# Patient Record
Sex: FEMALE | Race: WHITE | NOT HISPANIC OR LATINO | Employment: FULL TIME | URBAN - NONMETROPOLITAN AREA
[De-identification: names, ages, dates, MRNs, and addresses within clinical notes are randomized per-mention and may not be internally consistent; named-entity substitution may affect disease eponyms.]

---

## 2017-04-26 ENCOUNTER — OFFICE VISIT (OUTPATIENT)
Dept: CARDIOLOGY | Facility: CLINIC | Age: 32
End: 2017-04-26

## 2017-04-26 VITALS
HEIGHT: 69 IN | OXYGEN SATURATION: 97 % | SYSTOLIC BLOOD PRESSURE: 124 MMHG | WEIGHT: 293 LBS | DIASTOLIC BLOOD PRESSURE: 86 MMHG | HEART RATE: 71 BPM | BODY MASS INDEX: 43.4 KG/M2

## 2017-04-26 DIAGNOSIS — R07.2 PRECORDIAL PAIN: ICD-10-CM

## 2017-04-26 DIAGNOSIS — I10 ESSENTIAL HYPERTENSION: Primary | ICD-10-CM

## 2017-04-26 PROCEDURE — 93000 ELECTROCARDIOGRAM COMPLETE: CPT | Performed by: PHYSICIAN ASSISTANT

## 2017-04-26 PROCEDURE — 99204 OFFICE O/P NEW MOD 45 MIN: CPT | Performed by: PHYSICIAN ASSISTANT

## 2017-04-26 RX ORDER — LISINOPRIL 20 MG/1
10 TABLET ORAL DAILY
COMMUNITY
End: 2017-10-02

## 2017-04-26 RX ORDER — METFORMIN HYDROCHLORIDE 500 MG/1
1000 TABLET, EXTENDED RELEASE ORAL 2 TIMES DAILY
COMMUNITY
End: 2022-10-18 | Stop reason: SDUPTHER

## 2017-04-26 RX ORDER — LEVOTHYROXINE SODIUM 0.07 MG/1
75 TABLET ORAL DAILY
COMMUNITY
End: 2022-10-18 | Stop reason: SDUPTHER

## 2017-04-26 RX ORDER — NORGESTIMATE AND ETHINYL ESTRADIOL 7DAYSX3 28
1 KIT ORAL DAILY
COMMUNITY
End: 2019-11-12

## 2017-04-26 RX ORDER — DICLOFENAC SODIUM AND MISOPROSTOL 50; 200 MG/1; UG/1
1 TABLET, DELAYED RELEASE ORAL
COMMUNITY
End: 2019-11-12

## 2017-04-26 RX ORDER — METOPROLOL TARTRATE 50 MG/1
50 TABLET, FILM COATED ORAL 2 TIMES DAILY
COMMUNITY
End: 2018-04-03 | Stop reason: SDUPTHER

## 2017-04-26 RX ORDER — ONDANSETRON 4 MG/1
4 TABLET, FILM COATED ORAL AS NEEDED
COMMUNITY
End: 2018-04-03

## 2017-04-26 NOTE — PROGRESS NOTES
Subjective   Janeth Sheets is a 31 y.o. female     Chief Complaint   Patient presents with   • Establish Care     patient appears in office today to est cardiac care; HTN       HPI  The patient presents today for initial evaluation.  She presents because of hypertension and chest pain.  The patient reports that she is had a long-standing history an issue with hypertension.  Her most severe episode of hypertension occurred last summer.  She apparently stopped all medications/antihypertensive therapies at that time.  Blood pressures were as high as 240 over mid 140s by her report.  She started taking clonidine, which she got from her father, and blood pressures eventually did improve.  She went to see her primary care provider and eventually was placed on lisinopril and metoprolol.  There is been some concern because a lisinopril therapy.  She has been cautioned in terms of pregnancy risk with that therapy.  She would like to potentially think about alternate antihypertensive therapy.  She does feel that blood pressures are mostly controlled, but she does have hypertensive excursions at times.  Despite taking her medications faithfully, blood pressures to go between 140 to 150s systolically and mid 80s to mid 90s with regards to diastolic numbers.  At times, she does have chest pain.  She reports precordial chest tightness.  She cannot necessarily correlate this with hypertensive excursions.  She can have this with exertion or with rest.  Occasionally she has referral to bilateral upper extremities.  She has associated dyspnea but no diaphoresis or nausea at that time.  The patient is concerned with symptoms given her family history.  She would like to have further evaluation from cardiac standpoint at this time.      Current Outpatient Prescriptions   Medication Sig Dispense Refill   • diclofenac-misoprostol (ARTHROTEC 50) 50-0.2 MG EC tablet Take 1 tablet by mouth 2 (Two) Times a Day.     • levothyroxine  (SYNTHROID, LEVOTHROID) 75 MCG tablet Take 75 mcg by mouth Daily.     • lisinopril (PRINIVIL,ZESTRIL) 20 MG tablet Take 20 mg by mouth Daily.     • metFORMIN ER (GLUCOPHAGE-XR) 500 MG 24 hr tablet Take 500 mg by mouth 3 (Three) Times a Day. Take one in the AM with breakfast; and 2 in the PM as directed     • metoprolol tartrate (LOPRESSOR) 50 MG tablet Take 50 mg by mouth 2 (Two) Times a Day.     • Multiple Vitamins-Minerals (MULTIVITAMIN ADULT PO) Take  by mouth Daily.     • norgestimate-ethinyl estradiol (TRI-SPRINTEC) 0.18/0.215/0.25 MG-35 MCG per tablet Take 1 tablet by mouth Daily.     • ondansetron (ZOFRAN) 4 MG tablet Take 4 mg by mouth As Needed for Nausea or Vomiting.       No current facility-administered medications for this visit.        Penicillins    Past Medical History:   Diagnosis Date   • Hypertension    • Hypothyroidism    • Polycystic disease, ovaries     PCOS       Social History     Social History   • Marital status:      Spouse name: N/A   • Number of children: N/A   • Years of education: N/A     Occupational History   • Not on file.     Social History Main Topics   • Smoking status: Never Smoker   • Smokeless tobacco: Never Used   • Alcohol use Yes      Comment: occasional    • Drug use: No   • Sexual activity: Defer     Other Topics Concern   • Not on file     Social History Narrative   • No narrative on file       Family History   Problem Relation Age of Onset   • No Known Problems Mother    • Hypertension Father    • Hyperlipidemia Father    • Heart disease Father    • Heart attack Father    • Anemia Father        Review of Systems   Constitutional: Negative for fatigue.   HENT: Negative for congestion, ear pain, hearing loss, sneezing and tinnitus.    Eyes: Positive for visual disturbance (wears glasses /contacts daily).   Respiratory: Negative.  Negative for cough, chest tightness, shortness of breath and wheezing.    Cardiovascular: Positive for chest pain (occasional ) and  "palpitations (occasional ). Negative for leg swelling.   Gastrointestinal: Negative.  Negative for abdominal pain, constipation, diarrhea, nausea and vomiting.   Endocrine: Negative.  Negative for cold intolerance, heat intolerance and polyuria.   Genitourinary: Negative.  Negative for difficulty urinating, frequency and urgency.   Musculoskeletal: Positive for arthralgias. Negative for back pain, myalgias, neck pain and neck stiffness.   Skin: Negative.    Allergic/Immunologic: Positive for environmental allergies (seasonal allergies). Negative for food allergies.   Neurological: Positive for headaches. Negative for dizziness, syncope, weakness, light-headedness and numbness.   Hematological: Negative.  Does not bruise/bleed easily.   Psychiatric/Behavioral: Negative.  Negative for agitation, confusion and sleep disturbance. The patient is not nervous/anxious.        Objective     /86 (BP Location: Left arm, Patient Position: Sitting)  Pulse 71  Ht 69\" (175.3 cm)  Wt 299 lb 12.8 oz (136 kg)  SpO2 97%  BMI 44.27 kg/m2    Lab Results (most recent)     None          Physical Exam   Constitutional: She is oriented to person, place, and time. She appears well-developed and well-nourished. No distress.   HENT:   Head: Normocephalic and atraumatic.   Eyes: Conjunctivae and EOM are normal. Pupils are equal, round, and reactive to light.   Neck: Normal range of motion. Neck supple. No JVD present. No tracheal deviation present.   Cardiovascular: Normal rate, regular rhythm, normal heart sounds and intact distal pulses.    Pulmonary/Chest: Effort normal and breath sounds normal.   Abdominal: Soft. Bowel sounds are normal. She exhibits no distension and no mass. There is no tenderness. There is no rebound and no guarding.   Musculoskeletal: Normal range of motion. She exhibits no edema, tenderness or deformity.   Neurological: She is alert and oriented to person, place, and time.   Skin: Skin is warm and dry. No " rash noted. No erythema. No pallor.   Psychiatric: She has a normal mood and affect. Her behavior is normal. Judgment and thought content normal.   Nursing note and vitals reviewed.      Procedure     ECG 12 Lead  Date/Time: 4/26/2017 10:19 AM  Performed by: SARAHY SMITH  Authorized by: SARAHY SMITH   Comments: HTN  Sinus arrhythmia, normal axis, RSR prime in V1 and V2, no acute changes noted.                 Assessment/Plan      Diagnosis Plan   1. Essential hypertension  ECG 12 Lead    Treadmill Stress Test    Adult Transthoracic Echo Complete    Treadmill Stress Test    Adult Transthoracic Echo Complete   2. Precordial pain  Treadmill Stress Test    Adult Transthoracic Echo Complete    Treadmill Stress Test    Adult Transthoracic Echo Complete       With episodes of chest pain, I will schedule for regular treadmill stress test and echo.  I feel she needs evaluation given symptoms, but especially in setting of her strong family history of CAD.  I would like to eventually discuss discontinuing lisinopril therapy.  I'm concerned given pregnancy risk with that medication in this young lady.  I would like to see above studies and see her back at that time.  We may discuss alternate therapy/alternate anti-hypertensive therapies at that time.  In the interim, we did discuss lifestyle modifications.  Further pending all the above.

## 2017-05-10 ENCOUNTER — OUTSIDE FACILITY SERVICE (OUTPATIENT)
Dept: CARDIOLOGY | Facility: CLINIC | Age: 32
End: 2017-05-10

## 2017-05-10 ENCOUNTER — HOSPITAL ENCOUNTER (OUTPATIENT)
Dept: CARDIOLOGY | Facility: HOSPITAL | Age: 32
Discharge: HOME | End: 2017-05-10
Admitting: PHYSICIAN ASSISTANT

## 2017-05-10 ENCOUNTER — HOSPITAL ENCOUNTER (OUTPATIENT)
Dept: CARDIOLOGY | Facility: HOSPITAL | Age: 32
Discharge: HOME | End: 2017-05-10

## 2017-05-10 LAB
MAXIMAL PREDICTED HEART RATE: 191 BPM
STRESS TARGET HR: 162 BPM

## 2017-05-10 PROCEDURE — 93306 TTE W/DOPPLER COMPLETE: CPT

## 2017-05-10 PROCEDURE — 93017 CV STRESS TEST TRACING ONLY: CPT

## 2017-05-10 PROCEDURE — 93018 CV STRESS TEST I&R ONLY: CPT | Performed by: INTERNAL MEDICINE

## 2017-05-10 PROCEDURE — 93308 TTE F-UP OR LMTD: CPT | Performed by: INTERNAL MEDICINE

## 2017-05-15 ENCOUNTER — DOCUMENTATION (OUTPATIENT)
Dept: CARDIOLOGY | Facility: CLINIC | Age: 32
End: 2017-05-15

## 2017-05-25 ENCOUNTER — OFFICE VISIT (OUTPATIENT)
Dept: CARDIOLOGY | Facility: CLINIC | Age: 32
End: 2017-05-25

## 2017-05-25 VITALS
OXYGEN SATURATION: 96 % | WEIGHT: 293 LBS | DIASTOLIC BLOOD PRESSURE: 101 MMHG | HEIGHT: 69 IN | SYSTOLIC BLOOD PRESSURE: 167 MMHG | HEART RATE: 74 BPM | BODY MASS INDEX: 43.4 KG/M2

## 2017-05-25 DIAGNOSIS — I10 ESSENTIAL HYPERTENSION: Primary | ICD-10-CM

## 2017-05-25 DIAGNOSIS — R06.02 SHORTNESS OF BREATH: ICD-10-CM

## 2017-05-25 PROCEDURE — 99213 OFFICE O/P EST LOW 20 MIN: CPT | Performed by: PHYSICIAN ASSISTANT

## 2017-05-25 RX ORDER — AMLODIPINE BESYLATE 5 MG/1
5 TABLET ORAL DAILY
Qty: 30 TABLET | Refills: 11 | Status: SHIPPED | OUTPATIENT
Start: 2017-05-25 | End: 2018-04-03 | Stop reason: SDUPTHER

## 2017-05-26 ENCOUNTER — TELEPHONE (OUTPATIENT)
Dept: CARDIOLOGY | Facility: CLINIC | Age: 32
End: 2017-05-26

## 2017-05-26 RX ORDER — CLONIDINE HYDROCHLORIDE 0.1 MG/1
0.1 TABLET ORAL 3 TIMES DAILY PRN
Qty: 30 TABLET | Refills: 5 | Status: SHIPPED | OUTPATIENT
Start: 2017-05-26 | End: 2020-05-12 | Stop reason: SDUPTHER

## 2017-06-02 ENCOUNTER — CLINICAL SUPPORT (OUTPATIENT)
Dept: CARDIOLOGY | Facility: CLINIC | Age: 32
End: 2017-06-02

## 2017-06-02 VITALS
DIASTOLIC BLOOD PRESSURE: 104 MMHG | BODY MASS INDEX: 43.4 KG/M2 | SYSTOLIC BLOOD PRESSURE: 151 MMHG | HEIGHT: 69 IN | HEART RATE: 70 BPM | OXYGEN SATURATION: 97 % | WEIGHT: 293 LBS

## 2017-06-02 DIAGNOSIS — I10 ESSENTIAL HYPERTENSION: Primary | ICD-10-CM

## 2017-06-02 NOTE — PROGRESS NOTES
Janeth Sheets  1985  6/2/2017   ?   Chief Complaint   Patient presents with   • Hypertension     NURSE VISIT B/P CHECK      ?   HPI:   ?   ?Patient here for b/p check due to HX HTN. B/P log from  Home brought to appt. States can tell when her b/p is elevated because she will have a h/a, fatigue, and when it gets extremely high she has pain in her arms. She has not had to use any clonidine. Will address all of above with ANGE Ballard and proceed accordingly. PH,LPN  ?     Current Outpatient Prescriptions:   •  amLODIPine (NORVASC) 5 MG tablet, Take 1 tablet by mouth Daily., Disp: 30 tablet, Rfl: 11  •  diclofenac-misoprostol (ARTHROTEC 50) 50-0.2 MG EC tablet, Take 1 tablet by mouth 2 (Two) Times a Day., Disp: , Rfl:   •  levothyroxine (SYNTHROID, LEVOTHROID) 75 MCG tablet, Take 75 mcg by mouth Daily., Disp: , Rfl:   •  lisinopril (PRINIVIL,ZESTRIL) 20 MG tablet, Take 10 mg by mouth Daily., Disp: , Rfl:   •  metFORMIN ER (GLUCOPHAGE-XR) 500 MG 24 hr tablet, Take 500 mg by mouth 3 (Three) Times a Day. Take one in the AM with breakfast; and 2 in the PM as directed, Disp: , Rfl:   •  metoprolol tartrate (LOPRESSOR) 50 MG tablet, Take 50 mg by mouth 2 (Two) Times a Day., Disp: , Rfl:   •  Multiple Vitamins-Minerals (MULTIVITAMIN ADULT PO), Take  by mouth Daily., Disp: , Rfl:   •  norgestimate-ethinyl estradiol (TRI-SPRINTEC) 0.18/0.215/0.25 MG-35 MCG per tablet, Take 1 tablet by mouth Daily., Disp: , Rfl:   •  ondansetron (ZOFRAN) 4 MG tablet, Take 4 mg by mouth As Needed for Nausea or Vomiting., Disp: , Rfl:   •  CloNIDine (CATAPRES) 0.1 MG tablet, Take 1 tablet by mouth 3 (Three) Times a Day As Needed for High Blood Pressure (> 160/90)., Disp: 30 tablet, Rfl: 5   ?   ?   Penicillins       Procedures     ?   Assessment/Plan    ?   ? 1. HTN    Home b/p log reviewed by ANGE Ballard. Verbal orders received to continue meds as prescribed and f/u here at the office in 4 weeks. Verbal orders read back and  verified by [place], PAC. Patient aware, verbalized ok. PH,LPN  ?

## 2017-07-05 ENCOUNTER — OFFICE VISIT (OUTPATIENT)
Dept: CARDIOLOGY | Facility: CLINIC | Age: 32
End: 2017-07-05

## 2017-07-05 VITALS
HEART RATE: 64 BPM | SYSTOLIC BLOOD PRESSURE: 124 MMHG | OXYGEN SATURATION: 97 % | DIASTOLIC BLOOD PRESSURE: 82 MMHG | BODY MASS INDEX: 43.4 KG/M2 | HEIGHT: 69 IN | WEIGHT: 293 LBS

## 2017-07-05 DIAGNOSIS — R00.0 TACHYCARDIA: ICD-10-CM

## 2017-07-05 DIAGNOSIS — R00.2 PALPITATIONS: Primary | ICD-10-CM

## 2017-07-05 DIAGNOSIS — R07.2 PRECORDIAL PAIN: ICD-10-CM

## 2017-07-05 PROCEDURE — 99213 OFFICE O/P EST LOW 20 MIN: CPT | Performed by: PHYSICIAN ASSISTANT

## 2017-07-05 NOTE — PROGRESS NOTES
Problem list     Subjective   Janeth Sheets is a 31 y.o. female     Chief Complaint   Patient presents with   • Follow-up     1 mo. f/u       HPI  The patient presents back today to review medication change.  We had increased metoprolol at last visit, now increased to 50 g twice a day.  We had decrease lisinopril from 20-10 mg given pregnancy risk in her current age.  We had instituted amlodipine for better blood pressure control.  Blood pressures have normalized.  With increase in beta blocker therapy, her tachycardia and palpitations otherwise seemed to have resolved.  Her prior workup is been unremarkable from cardiac standpoint.  At this time she feels well and denies current chest pain/anginal equivalent symptoms, failure, or significant dysrhythmic symptoms.  She has no further complaints otherwise.    Current Outpatient Prescriptions   Medication Sig Dispense Refill   • amLODIPine (NORVASC) 5 MG tablet Take 1 tablet by mouth Daily. 30 tablet 11   • diclofenac-misoprostol (ARTHROTEC 50) 50-0.2 MG EC tablet Take 1 tablet by mouth. Prn h/a's     • levothyroxine (SYNTHROID, LEVOTHROID) 75 MCG tablet Take 75 mcg by mouth Daily.     • lisinopril (PRINIVIL,ZESTRIL) 20 MG tablet Take 10 mg by mouth Daily.     • metFORMIN ER (GLUCOPHAGE-XR) 500 MG 24 hr tablet Take 500 mg by mouth 3 (Three) Times a Day. Take 2 in the AM with breakfast; and 1 in the PM as directed     • metoprolol tartrate (LOPRESSOR) 50 MG tablet Take 50 mg by mouth 2 (Two) Times a Day.     • Multiple Vitamins-Minerals (MULTIVITAMIN ADULT PO) Take  by mouth Daily.     • norgestimate-ethinyl estradiol (TRI-SPRINTEC) 0.18/0.215/0.25 MG-35 MCG per tablet Take 1 tablet by mouth Daily.     • ondansetron (ZOFRAN) 4 MG tablet Take 4 mg by mouth As Needed for Nausea or Vomiting.     • CloNIDine (CATAPRES) 0.1 MG tablet Take 1 tablet by mouth 3 (Three) Times a Day As Needed for High Blood Pressure (> 160/90). 30 tablet 5     No current facility-administered  "medications for this visit.        Penicillins    Past Medical History:   Diagnosis Date   • Hypertension    • Hypothyroidism    • Polycystic disease, ovaries     PCOS       Social History     Social History   • Marital status:      Spouse name: N/A   • Number of children: N/A   • Years of education: N/A     Occupational History   • Not on file.     Social History Main Topics   • Smoking status: Never Smoker   • Smokeless tobacco: Never Used   • Alcohol use Yes      Comment: occasional    • Drug use: No   • Sexual activity: Defer     Other Topics Concern   • Not on file     Social History Narrative       Family History   Problem Relation Age of Onset   • No Known Problems Mother    • Hypertension Father    • Hyperlipidemia Father    • Heart disease Father    • Heart attack Father    • Anemia Father        Review of Systems   Constitutional: Positive for fatigue (depends on job).   HENT: Negative.    Eyes: Positive for visual disturbance (wears glasses).   Respiratory: Negative.    Cardiovascular: Positive for palpitations (only if medications missed) and leg swelling (ankles). Negative for chest pain.   Gastrointestinal: Negative.    Endocrine: Negative.    Genitourinary: Negative.    Musculoskeletal: Positive for arthralgias, back pain and myalgias.   Skin: Negative.    Allergic/Immunologic: Positive for environmental allergies.   Neurological: Negative.    Hematological: Negative.    Psychiatric/Behavioral: Positive for sleep disturbance (work related).       Objective   /82 (BP Location: Left arm, Patient Position: Sitting)  Pulse 64  Ht 69\" (175.3 cm)  Wt (!) 301 lb 12.8 oz (137 kg)  SpO2 97%  BMI 44.57 kg/m2  Lab Results (most recent)     None        Physical Exam   Constitutional: She is oriented to person, place, and time. She appears well-developed and well-nourished. No distress.   HENT:   Head: Normocephalic and atraumatic.   Eyes: Conjunctivae and EOM are normal. Pupils are equal, round, " and reactive to light.   Neck: Normal range of motion. Neck supple. No JVD present. No tracheal deviation present.   Cardiovascular: Normal rate, regular rhythm, normal heart sounds and intact distal pulses.    Pulmonary/Chest: Effort normal and breath sounds normal.   Abdominal: Soft. Bowel sounds are normal. She exhibits no distension and no mass. There is no tenderness. There is no rebound and no guarding.   Musculoskeletal: Normal range of motion. She exhibits no edema, tenderness or deformity.   Neurological: She is alert and oriented to person, place, and time.   Skin: Skin is warm and dry. No rash noted. No erythema. No pallor.   Psychiatric: She has a normal mood and affect. Her behavior is normal. Judgment and thought content normal.   Nursing note and vitals reviewed.        Procedure   Procedures       Assessment/Plan      Diagnosis Plan   1. Palpitations     2. Tachycardia     3. Precordial pain       The patient seems to be doing well.  She is improved on current medical regimen.  With increase in metoprolol, she did have less tachycardic episodes.  Blood pressures of normalized.  Our goal is to discontinue lisinopril because of pregnancy risk.  I've asked that she go ahead and discontinue that completely.  She can increase amlodipine, as I reviewed with her today, if needed for better blood pressure control.  Previous workup is been unremarkable.  I feel nothing further is warranted or indicated.  We will see the patient back in 2 months to ensure ongoing stability.

## 2017-10-02 ENCOUNTER — OFFICE VISIT (OUTPATIENT)
Dept: CARDIOLOGY | Facility: CLINIC | Age: 32
End: 2017-10-02

## 2017-10-02 VITALS
SYSTOLIC BLOOD PRESSURE: 128 MMHG | BODY MASS INDEX: 43.4 KG/M2 | WEIGHT: 293 LBS | DIASTOLIC BLOOD PRESSURE: 86 MMHG | HEART RATE: 61 BPM | OXYGEN SATURATION: 96 % | HEIGHT: 69 IN

## 2017-10-02 DIAGNOSIS — I10 ESSENTIAL HYPERTENSION: Primary | ICD-10-CM

## 2017-10-02 DIAGNOSIS — R07.2 PRECORDIAL PAIN: ICD-10-CM

## 2017-10-02 DIAGNOSIS — R06.09 DYSPNEA ON EXERTION: ICD-10-CM

## 2017-10-02 PROCEDURE — 99213 OFFICE O/P EST LOW 20 MIN: CPT | Performed by: PHYSICIAN ASSISTANT

## 2017-10-02 NOTE — PROGRESS NOTES
Problem list     Subjective   Janeth Hauser is a 31 y.o. female     Chief Complaint   Patient presents with   • Follow-up     patient appears in office today for follow up on medication change   • Hypertension     patient states B/P has been WNL with current medication changes    • Palpitations     patient states she only has palpitations/flutters when she forgets to take medications       HPI  The patient presents back in today for routine evaluation and follow-up.  We had seen her in the setting of chest pain hypertension in the past.  Her stress test and echo and follow-up were unremarkable.  We will also wanted to discontinue lisinopril as she was of childbearing age.  There was discontinued and we have slowly titrated amlodipine in beta blocker therapy.  With discontinuation of that medication and titration of her additional antihypertensive therapy, the patient has been normotensive.  Currently she has no further chest pain.  She reports stable dyspnea.  She denies failure or dysrhythmic symptoms otherwise.  She has no further complaints.    Current Outpatient Prescriptions   Medication Sig Dispense Refill   • amLODIPine (NORVASC) 5 MG tablet Take 1 tablet by mouth Daily. 30 tablet 11   • CloNIDine (CATAPRES) 0.1 MG tablet Take 1 tablet by mouth 3 (Three) Times a Day As Needed for High Blood Pressure (> 160/90). 30 tablet 5   • diclofenac-misoprostol (ARTHROTEC 50) 50-0.2 MG EC tablet Take 1 tablet by mouth. Prn h/a's     • levothyroxine (SYNTHROID, LEVOTHROID) 75 MCG tablet Take 75 mcg by mouth Daily.     • metFORMIN ER (GLUCOPHAGE-XR) 500 MG 24 hr tablet Take 500 mg by mouth 3 (Three) Times a Day. Take 2 in the AM with breakfast; and 1 in the PM as directed     • metoprolol tartrate (LOPRESSOR) 50 MG tablet Take 50 mg by mouth 2 (Two) Times a Day.     • Multiple Vitamins-Minerals (MULTIVITAMIN ADULT PO) Take  by mouth Daily.     • norgestimate-ethinyl estradiol (TRI-SPRINTEC) 0.18/0.215/0.25 MG-35 MCG per  tablet Take 1 tablet by mouth Daily.     • ondansetron (ZOFRAN) 4 MG tablet Take 4 mg by mouth As Needed for Nausea or Vomiting.       No current facility-administered medications for this visit.        Penicillins    Past Medical History:   Diagnosis Date   • Hypertension    • Hypothyroidism    • Polycystic disease, ovaries     PCOS       Social History     Social History   • Marital status:      Spouse name: N/A   • Number of children: N/A   • Years of education: N/A     Occupational History   • Not on file.     Social History Main Topics   • Smoking status: Never Smoker   • Smokeless tobacco: Never Used   • Alcohol use Yes      Comment: occasional    • Drug use: No   • Sexual activity: Defer     Other Topics Concern   • Not on file     Social History Narrative       Family History   Problem Relation Age of Onset   • No Known Problems Mother    • Hypertension Father    • Hyperlipidemia Father    • Heart disease Father    • Heart attack Father    • Anemia Father        Review of Systems   Constitutional: Negative.  Negative for fatigue.   HENT: Negative for congestion, sinus pressure, sneezing and sore throat.    Eyes: Positive for visual disturbance (wears glasses daily).   Respiratory: Positive for shortness of breath (on exertion only). Negative for cough, chest tightness and wheezing.    Cardiovascular: Positive for palpitations (only when she forgets to take medications). Negative for chest pain (denies CP) and leg swelling.   Gastrointestinal: Negative.  Negative for abdominal pain, nausea and vomiting.   Endocrine: Negative.  Negative for cold intolerance, heat intolerance, polyphagia and polyuria.   Genitourinary: Negative.  Negative for difficulty urinating, frequency and urgency.   Musculoskeletal: Negative.  Negative for arthralgias, back pain, myalgias, neck pain and neck stiffness.   Skin: Negative.  Negative for rash and wound.   Allergic/Immunologic: Positive for environmental allergies  "(seasonal allergies). Negative for food allergies.   Neurological: Positive for headaches (occasional migraines). Negative for dizziness, weakness and light-headedness.   Hematological: Negative.  Does not bruise/bleed easily.   Psychiatric/Behavioral: Negative for agitation, confusion and sleep disturbance (denies waking up smothering/SOA). The patient is not nervous/anxious.        Objective   /86 (BP Location: Left arm, Patient Position: Sitting)  Pulse 61  Ht 69\" (175.3 cm)  Wt 296 lb 6.4 oz (134 kg)  SpO2 96%  BMI 43.77 kg/m2  Lab Results (most recent)     None        Physical Exam   Constitutional: She is oriented to person, place, and time. She appears well-developed and well-nourished. No distress.   HENT:   Head: Normocephalic and atraumatic.   Eyes: Conjunctivae and EOM are normal. Pupils are equal, round, and reactive to light.   Neck: Normal range of motion. Neck supple. No JVD present. No tracheal deviation present.   Cardiovascular: Normal rate, regular rhythm, normal heart sounds and intact distal pulses.    Pulmonary/Chest: Effort normal and breath sounds normal.   Abdominal: Soft. Bowel sounds are normal. She exhibits no distension and no mass. There is no tenderness. There is no rebound and no guarding.   Musculoskeletal: Normal range of motion. She exhibits no edema, tenderness or deformity.   Neurological: She is alert and oriented to person, place, and time.   Skin: Skin is warm and dry. No rash noted. No erythema. No pallor.   Psychiatric: She has a normal mood and affect. Her behavior is normal. Judgment and thought content normal.   Nursing note and vitals reviewed.        Procedure   Procedures       Assessment/Plan      Diagnosis Plan   1. Essential hypertension     2. Precordial pain     3. Dyspnea on exertion       The patient continues to have well-controlled blood pressures on current medical regimen.  Previous cardiac workup is been benign.  She is feeling well at this time. "  I feel nothing further is indicated.  We will see the patient back in 6 months, sooner if indicated by course.

## 2018-04-03 ENCOUNTER — OFFICE VISIT (OUTPATIENT)
Dept: CARDIOLOGY | Facility: CLINIC | Age: 33
End: 2018-04-03

## 2018-04-03 VITALS
HEIGHT: 69 IN | HEART RATE: 68 BPM | WEIGHT: 293 LBS | BODY MASS INDEX: 43.4 KG/M2 | SYSTOLIC BLOOD PRESSURE: 135 MMHG | OXYGEN SATURATION: 96 % | DIASTOLIC BLOOD PRESSURE: 94 MMHG

## 2018-04-03 DIAGNOSIS — R06.09 DYSPNEA ON EXERTION: ICD-10-CM

## 2018-04-03 DIAGNOSIS — I10 ESSENTIAL HYPERTENSION: Primary | ICD-10-CM

## 2018-04-03 DIAGNOSIS — R00.2 PALPITATIONS: ICD-10-CM

## 2018-04-03 PROCEDURE — 99213 OFFICE O/P EST LOW 20 MIN: CPT | Performed by: PHYSICIAN ASSISTANT

## 2018-04-03 RX ORDER — METOPROLOL TARTRATE 50 MG/1
50 TABLET, FILM COATED ORAL EVERY 12 HOURS SCHEDULED
Qty: 60 TABLET | Refills: 11 | Status: SHIPPED | OUTPATIENT
Start: 2018-04-03 | End: 2018-10-15 | Stop reason: ALTCHOICE

## 2018-04-03 RX ORDER — AMLODIPINE BESYLATE 5 MG/1
5 TABLET ORAL DAILY
Qty: 30 TABLET | Refills: 11 | Status: SHIPPED | OUTPATIENT
Start: 2018-04-03 | End: 2018-10-03 | Stop reason: DRUGHIGH

## 2018-04-03 NOTE — PATIENT INSTRUCTIONS
Obesity, Adult  Obesity is the condition of having too much total body fat. Being overweight or obese means that your weight is greater than what is considered healthy for your body size. Obesity is determined by a measurement called BMI. BMI is an estimate of body fat and is calculated from height and weight. For adults, a BMI of 30 or higher is considered obese.  Obesity can eventually lead to other health concerns and major illnesses, including:  · Stroke.  · Coronary artery disease (CAD).  · Type 2 diabetes.  · Some types of cancer, including cancers of the colon, breast, uterus, and gallbladder.  · Osteoarthritis.  · High blood pressure (hypertension).  · High cholesterol.  · Sleep apnea.  · Gallbladder stones.  · Infertility problems.  What are the causes?  The main cause of obesity is taking in (consuming) more calories than your body uses for energy. Other factors that contribute to this condition may include:  · Being born with genes that make you more likely to become obese.  · Having a medical condition that causes obesity. These conditions include:  ¨ Hypothyroidism.  ¨ Polycystic ovarian syndrome (PCOS).  ¨ Binge-eating disorder.  ¨ Cushing syndrome.  · Taking certain medicines, such as steroids, antidepressants, and seizure medicines.  · Not being physically active (sedentary lifestyle).  · Living where there are limited places to exercise safely or buy healthy foods.  · Not getting enough sleep.  What increases the risk?  The following factors may increase your risk of this condition:  · Having a family history of obesity.  · Being a woman of -American descent.  · Being a man of  descent.  What are the signs or symptoms?  Having excessive body fat is the main symptom of this condition.  How is this diagnosed?  This condition may be diagnosed based on:  · Your symptoms.  · Your medical history.  · A physical exam. Your health care provider may measure:  ¨ Your BMI. If you are an adult  with a BMI between 25 and less than 30, you are considered overweight. If you are an adult with a BMI of 30 or higher, you are considered obese.  ¨ The distances around your hips and your waist (circumferences). These may be compared to each other to help diagnose your condition.  ¨ Your skinfold thickness. Your health care provider may gently pinch a fold of your skin and measure it.  How is this treated?  Treatment for this condition often includes changing your lifestyle. Treatment may include some or all of the following:  · Dietary changes. Work with your health care provider and a dietitian to set a weight-loss goal that is healthy and reasonable for you. Dietary changes may include eating:  ¨ Smaller portions. A portion size is the amount of a particular food that is healthy for you to eat at one time. This varies from person to person.  ¨ Low-calorie or low-fat options.  ¨ More whole grains, fruits, and vegetables.  · Regular physical activity. This may include aerobic activity (cardio) and strength training.  · Medicine to help you lose weight. Your health care provider may prescribe medicine if you are unable to lose 1 pound a week after 6 weeks of eating more healthily and doing more physical activity.  · Surgery. Surgical options may include gastric banding and gastric bypass. Surgery may be done if:  ¨ Other treatments have not helped to improve your condition.  ¨ You have a BMI of 40 or higher.  ¨ You have life-threatening health problems related to obesity.  Follow these instructions at home:     Eating and drinking     · Follow recommendations from your health care provider about what you eat and drink. Your health care provider may advise you to:  ¨ Limit fast foods, sweets, and processed snack foods.  ¨ Choose low-fat options, such as low-fat milk instead of whole milk.  ¨ Eat 5 or more servings of fruits or vegetables every day.  ¨ Eat at home more often. This gives you more control over what you  eat.  ¨ Choose healthy foods when you eat out.  ¨ Learn what a healthy portion size is.  ¨ Keep low-fat snacks on hand.  ¨ Avoid sugary drinks, such as soda, fruit juice, iced tea sweetened with sugar, and flavored milk.  ¨ Eat a healthy breakfast.  · Drink enough water to keep your urine clear or pale yellow.  · Do not go without eating for long periods of time (do not fast) or follow a fad diet. Fasting and fad diets can be unhealthy and even dangerous.  Physical Activity   · Exercise regularly, as told by your health care provider. Ask your health care provider what types of exercise are safe for you and how often you should exercise.  · Warm up and stretch before being active.  · Cool down and stretch after being active.  · Rest between periods of activity.  Lifestyle   · Limit the time that you spend in front of your TV, computer, or video game system.  · Find ways to reward yourself that do not involve food.  · Limit alcohol intake to no more than 1 drink a day for nonpregnant women and 2 drinks a day for men. One drink equals 12 oz of beer, 5 oz of wine, or 1½ oz of hard liquor.  General instructions   · Keep a weight loss journal to keep track of the food you eat and how much you exercise you get.  · Take over-the-counter and prescription medicines only as told by your health care provider.  · Take vitamins and supplements only as told by your health care provider.  · Consider joining a support group. Your health care provider may be able to recommend a support group.  · Keep all follow-up visits as told by your health care provider. This is important.  Contact a health care provider if:  · You are unable to meet your weight loss goal after 6 weeks of dietary and lifestyle changes.  This information is not intended to replace advice given to you by your health care provider. Make sure you discuss any questions you have with your health care provider.  Document Released: 01/25/2006 Document Revised:  05/22/2017 Document Reviewed: 10/05/2016  [employer] Interactive Patient Education © 2017 [employer]  MyPlate from [employer]  The general, healthful diet is based on the 2010 Dietary Guidelines for Americans. The amount of food you need to eat from each food group depends on your age, sex, and level of physical activity and can be individualized by a dietitian. Go to ChooseMyPlate.gov for more information.  What do I need to know about the MyPlate plan?  · Enjoy your food, but eat less.  · Avoid oversized portions.  ¨ ½ of your plate should include fruits and vegetables.  ¨ ¼ of your plate should be grains.  ¨ ¼ of your plate should be protein.  Grains   · Make at least half of your grains whole grains.  · For a 2,000 calorie daily food plan, eat 6 oz every day.  · 1 oz is about 1 slice bread, 1 cup cereal, or ½ cup cooked rice, cereal, or pasta.  Vegetables   · Make half your plate fruits and vegetables.  · For a 2,000 calorie daily food plan, eat 2½ cups every day.  · 1 cup is about 1 cup raw or cooked vegetables or vegetable juice or 2 cups raw leafy greens.  Fruits   · Make half your plate fruits and vegetables.  · For a 2,000 calorie daily food plan, eat 2 cups every day.  · 1 cup is about 1 cup fruit or 100% fruit juice or ½ cup dried fruit.  Protein   · For a 2,000 calorie daily food plan, eat 5½ oz every day.  · 1 oz is about 1 oz meat, poultry, or fish, ¼ cup cooked beans, 1 egg, 1 Tbsp peanut butter, or ½ oz nuts or seeds.  Dairy   · Switch to fat-free or low-fat (1%) milk.  · For a 2,000 calorie daily food plan, eat 3 cups every day.  · 1 cup is about 1 cup milk or yogurt or soy milk (soy beverage), 1½ oz natural cheese, or 2 oz processed cheese.  Fats, Oils, and Empty Calories   · Only small amounts of oils are recommended.  · Empty calories are calories from solid fats or added sugars.  · Compare sodium in foods like soup, bread, and frozen meals. Choose the foods with lower numbers.  · Drink water instead  of sugary drinks.  What foods can I eat?  Grains   Whole grains such as whole wheat, quinoa, millet, and bulgur. Bread, rolls, and pasta made from whole grains. Brown or wild rice. Hot or cold cereals made from whole grains and without added sugar.  Vegetables   All fresh vegetables, especially fresh red, dark green, or orange vegetables. Peas and beans. Low-sodium frozen or canned vegetables prepared without added salt. Low-sodium vegetable juices.  Fruits   All fresh, frozen, and dried fruits. Canned fruit packed in water or fruit juice without added sugar. Fruit juices without added sugar.  Meats and Other Protein Sources   Boiled, baked, or grilled lean meat trimmed of fat. Skinless poultry. Fresh seafood and shellfish. Canned seafood packed in water. Unsalted nuts and unsalted nut butters. Tofu. Dried beans and pea. Eggs.  Dairy   Low-fat or fat-free milk, yogurt, and cheeses.  Sweets and Desserts   Frozen desserts made from low-fat milk.  Fats and Oils   Olive, peanut, and canola oils and margarine. Salad dressing and mayonnaise made from these oils.  Other   Soups and casseroles made from allowed ingredients and without added fat or salt.  The items listed above may not be a complete list of recommended foods or beverages. Contact your dietitian for more options.   What foods are not recommended?  Grains   Sweetened, low-fiber cereals. Packaged baked goods. Snack crackers and chips. Cheese crackers, butter crackers, and biscuits. Frozen waffles, sweet breads, doughnuts, pastries, packaged baking mixes, pancakes, cakes, and cookies.  Vegetables   Regular canned or frozen vegetables or vegetables prepared with salt. Canned tomatoes. Canned tomato sauce. Fried vegetables. Vegetables in cream sauce or cheese sauce.  Fruits   Fruits packed in syrup or made with added sugar.  Meats and Other Protein Sources   Marbled or fatty meats such as ribs. Poultry with skin. Fried meats, poultry, eggs, or fish. Sausages, hot  dogs, and deli meats such as pastrami, bologna, or salami.  Dairy   Whole milk, cream, cheeses made from whole milk, sour cream. Ice cream or yogurt made from whole milk or with added sugar.  Beverages   For adults, no more than one alcoholic drink per day. Regular soft drinks or other sugary beverages. Juice drinks.  Sweets and Desserts   Sugary or fatty desserts, candy, and other sweets.  Fats and Oils   Solid shortening or partially hydrogenated oils. Solid margarine. Margarine that contains trans fats. Butter.  The items listed above may not be a complete list of foods and beverages to avoid. Contact your dietitian for more information.   This information is not intended to replace advice given to you by your health care provider. Make sure you discuss any questions you have with your health care provider.  Document Released: 01/06/2009 Document Revised: 05/25/2017 Document Reviewed: 11/26/2014  [employer] Interactive Patient Education © 2017 Else[employer] Inc.

## 2018-04-03 NOTE — PROGRESS NOTES
Problem list     Subjective   Janeth Hauser is a 32 y.o. female     Chief Complaint   Patient presents with   • Hypertension     Here for 6 mo. f/u   • Hypothyroidism       HPI  The patient presents back in today for routine evaluation.  We had seen her because of palpitations and hypertension in the past.  Medical regimen was adjusted, as outlined in prior notes.  On current medical regimen, her blood pressures have seemingly improved.  She still has palpitations at times.  She feels this is mostly related to work schedule and stress.  Her palpitations are very minor and the patient relates to no sustained dysrhythmic activity.  She relates to no chest pain.  She has chronic but stable dyspnea.  She denies PND or orthopnea.  She is pending follow-up with her primary care provider for consideration of routine labs.  Otherwise, she is doing well and has no further cardiac complaints or issues.      Outpatient Encounter Prescriptions as of 4/3/2018   Medication Sig Dispense Refill   • amLODIPine (NORVASC) 5 MG tablet Take 1 tablet by mouth Daily. 30 tablet 11   • diclofenac-misoprostol (ARTHROTEC 50) 50-0.2 MG EC tablet Take 1 tablet by mouth. Prn h/a's     • levothyroxine (SYNTHROID, LEVOTHROID) 75 MCG tablet Take 75 mcg by mouth Daily.     • metFORMIN ER (GLUCOPHAGE-XR) 500 MG 24 hr tablet Take 500 mg by mouth 3 (Three) Times a Day. Take 2 in the AM with breakfast; and 1 in the PM as directed     • metoprolol tartrate (LOPRESSOR) 50 MG tablet Take 1 tablet by mouth Every 12 (Twelve) Hours. 60 tablet 11   • Multiple Vitamins-Minerals (MULTIVITAMIN ADULT PO) Take  by mouth Daily.     • norgestimate-ethinyl estradiol (TRI-SPRINTEC) 0.18/0.215/0.25 MG-35 MCG per tablet Take 1 tablet by mouth Daily.     • [DISCONTINUED] amLODIPine (NORVASC) 5 MG tablet Take 1 tablet by mouth Daily. 30 tablet 11   • [DISCONTINUED] metoprolol tartrate (LOPRESSOR) 50 MG tablet Take 50 mg by mouth 2 (Two) Times a Day.     • CloNIDine  "(CATAPRES) 0.1 MG tablet Take 1 tablet by mouth 3 (Three) Times a Day As Needed for High Blood Pressure (> 160/90). 30 tablet 5   • [DISCONTINUED] ondansetron (ZOFRAN) 4 MG tablet Take 4 mg by mouth As Needed for Nausea or Vomiting.       No facility-administered encounter medications on file as of 4/3/2018.        Penicillins    Past Medical History:   Diagnosis Date   • Hypertension    • Hypothyroidism    • Polycystic disease, ovaries     PCOS       Social History     Social History   • Marital status:      Spouse name: N/A   • Number of children: N/A   • Years of education: N/A     Occupational History   • Not on file.     Social History Main Topics   • Smoking status: Never Smoker   • Smokeless tobacco: Never Used   • Alcohol use Yes      Comment: occasional    • Drug use: No   • Sexual activity: Defer     Other Topics Concern   • Not on file     Social History Narrative   • No narrative on file       Family History   Problem Relation Age of Onset   • No Known Problems Mother    • Hypertension Father    • Hyperlipidemia Father    • Heart disease Father    • Heart attack Father    • Anemia Father        Review of Systems   Constitutional: Positive for fatigue (r/t job).   HENT: Negative.    Eyes: Positive for visual disturbance (glasses).   Respiratory: Negative.    Cardiovascular: Positive for palpitations. Negative for chest pain and leg swelling.   Gastrointestinal: Negative.    Endocrine: Negative.    Genitourinary: Negative.    Musculoskeletal: Positive for arthralgias and myalgias.   Skin: Negative.    Allergic/Immunologic: Positive for environmental allergies.   Neurological: Negative.    Hematological: Negative.    Psychiatric/Behavioral: Negative.        Objective   Vitals:    04/03/18 1550   BP: 135/94   BP Location: Left arm   Patient Position: Sitting   Pulse: 68   SpO2: 96%   Weight: (!) 138 kg (304 lb 3.2 oz)   Height: 175.3 cm (69.02\")      /94 (BP Location: Left arm, Patient Position: " "Sitting)   Pulse 68   Ht 175.3 cm (69.02\")   Wt (!) 138 kg (304 lb 3.2 oz)   SpO2 96%   BMI 44.90 kg/m²     Lab Results (most recent)     None          Physical Exam   Constitutional: She is oriented to person, place, and time. She appears well-developed and well-nourished. No distress.   HENT:   Head: Normocephalic and atraumatic.   Eyes: Conjunctivae and EOM are normal. Pupils are equal, round, and reactive to light.   Neck: Normal range of motion. Neck supple. No JVD present. No tracheal deviation present.   Cardiovascular: Normal rate, regular rhythm, normal heart sounds and intact distal pulses.    Pulmonary/Chest: Effort normal and breath sounds normal.   Abdominal: Soft. Bowel sounds are normal. She exhibits no distension and no mass. There is no tenderness. There is no rebound and no guarding.   Musculoskeletal: Normal range of motion. She exhibits no edema, tenderness or deformity.   Neurological: She is alert and oriented to person, place, and time.   Skin: Skin is warm and dry. No rash noted. No erythema. No pallor.   Psychiatric: She has a normal mood and affect. Her behavior is normal. Judgment and thought content normal.   Nursing note and vitals reviewed.      Procedure   Procedures       Assessment/Plan     Problems Addressed this Visit     None      Visit Diagnoses     Essential hypertension    -  Primary    Relevant Medications    metoprolol tartrate (LOPRESSOR) 50 MG tablet    amLODIPine (NORVASC) 5 MG tablet    Palpitations        Dyspnea on exertion               The patient is stable.  Previous cardiac workup including stress test and echo studies were unremarkable.  Blood pressures are largely controlled on current medical regimen.  She feels fine from cardiovascular standpoint.  I will make no changes.  We will see her back in 6 month, sooner if indicated by course.               Discussed the patient's BMI with her. BMI is above normal parameters. Follow-up plan includes:  referral to " primary care.       Electronically signed by:

## 2018-10-03 ENCOUNTER — OFFICE VISIT (OUTPATIENT)
Dept: CARDIOLOGY | Facility: CLINIC | Age: 33
End: 2018-10-03

## 2018-10-03 VITALS
WEIGHT: 293 LBS | OXYGEN SATURATION: 95 % | HEIGHT: 69 IN | BODY MASS INDEX: 43.4 KG/M2 | HEART RATE: 80 BPM | SYSTOLIC BLOOD PRESSURE: 147 MMHG | DIASTOLIC BLOOD PRESSURE: 100 MMHG

## 2018-10-03 DIAGNOSIS — R00.2 PALPITATIONS: ICD-10-CM

## 2018-10-03 DIAGNOSIS — I10 ESSENTIAL HYPERTENSION: Primary | ICD-10-CM

## 2018-10-03 DIAGNOSIS — R60.9 EDEMA, UNSPECIFIED TYPE: ICD-10-CM

## 2018-10-03 PROCEDURE — 99213 OFFICE O/P EST LOW 20 MIN: CPT | Performed by: PHYSICIAN ASSISTANT

## 2018-10-03 RX ORDER — PANTOPRAZOLE SODIUM 40 MG/1
40 TABLET, DELAYED RELEASE ORAL DAILY
COMMUNITY
End: 2019-11-12

## 2018-10-03 RX ORDER — AMLODIPINE BESYLATE 10 MG/1
10 TABLET ORAL DAILY
Qty: 90 TABLET | Refills: 3 | Status: SHIPPED | OUTPATIENT
Start: 2018-10-03 | End: 2019-11-12 | Stop reason: DRUGHIGH

## 2018-10-03 RX ORDER — AMLODIPINE BESYLATE 10 MG/1
10 TABLET ORAL DAILY
Qty: 90 TABLET | Refills: 3 | Status: SHIPPED | OUTPATIENT
Start: 2018-10-03 | End: 2018-10-03 | Stop reason: DRUGHIGH

## 2018-10-03 NOTE — PATIENT INSTRUCTIONS

## 2018-10-03 NOTE — PROGRESS NOTES
Problem list     Subjective   Janeth Hauser is a 32 y.o. female     Chief Complaint   Patient presents with   • Follow-up     patient appears in office today for 6 month follow up    • Hypertension       HPI  The patient presents in for follow-up.  We have seen her in the setting of hypertension and palpitations in the past.  Previous cardiac workup has been benign.  We have slowly titrated antihypertensive regimen blood pressures of slowly improved.  She continues to be hypertensive however.  She reports no chest discomfort.  She has stable dyspnea.  Her palpitations are at baseline, nonproblematic by patient report.  She has no sustained dysrhythmic activity.  She has stable lower extremity edema, present only since starting Norvasc.  She has no further complaints otherwise and feels that she is doing well.    Current Outpatient Prescriptions   Medication Sig Dispense Refill   • CloNIDine (CATAPRES) 0.1 MG tablet Take 1 tablet by mouth 3 (Three) Times a Day As Needed for High Blood Pressure (> 160/90). 30 tablet 5   • diclofenac-misoprostol (ARTHROTEC 50) 50-0.2 MG EC tablet Take 1 tablet by mouth. Prn h/a's     • levothyroxine (SYNTHROID, LEVOTHROID) 75 MCG tablet Take 75 mcg by mouth Daily.     • metFORMIN ER (GLUCOPHAGE-XR) 500 MG 24 hr tablet Take 500 mg by mouth 3 (Three) Times a Day. Take 2 in the AM with breakfast; and 1 in the PM as directed     • metoprolol tartrate (LOPRESSOR) 50 MG tablet Take 1 tablet by mouth Every 12 (Twelve) Hours. 60 tablet 11   • Multiple Vitamins-Minerals (MULTIVITAMIN ADULT PO) Take  by mouth Daily.     • norgestimate-ethinyl estradiol (TRI-SPRINTEC) 0.18/0.215/0.25 MG-35 MCG per tablet Take 1 tablet by mouth Daily.     • pantoprazole (PROTONIX) 40 MG EC tablet Take 40 mg by mouth Daily.     • amLODIPine (NORVASC) 10 MG tablet Take 1 tablet by mouth Daily. 90 tablet 3     No current facility-administered medications for this visit.        Penicillins    Past Medical History:    Diagnosis Date   • Hypertension    • Hypothyroidism    • Polycystic disease, ovaries     PCOS       Social History     Social History   • Marital status:      Spouse name: N/A   • Number of children: N/A   • Years of education: N/A     Occupational History   • Not on file.     Social History Main Topics   • Smoking status: Never Smoker   • Smokeless tobacco: Never Used   • Alcohol use Yes      Comment: occasional    • Drug use: No   • Sexual activity: Defer     Other Topics Concern   • Not on file     Social History Narrative   • No narrative on file       Family History   Problem Relation Age of Onset   • No Known Problems Mother    • Hypertension Father    • Hyperlipidemia Father    • Heart disease Father    • Heart attack Father    • Anemia Father        Review of Systems   Constitutional: Negative.  Negative for fatigue.   HENT: Negative.  Negative for congestion, rhinorrhea and sneezing.    Eyes: Positive for visual disturbance (wears glasses daily).   Respiratory: Positive for shortness of breath (SOA on exertion ). Negative for apnea, cough, chest tightness and wheezing.    Cardiovascular: Positive for palpitations (occasional palpitations) and leg swelling (occasional BLE swelling/edema ). Negative for chest pain (denies CP).   Gastrointestinal: Negative.  Negative for abdominal distention, abdominal pain, nausea and vomiting.   Endocrine: Negative.  Negative for cold intolerance and heat intolerance.   Genitourinary: Negative.  Negative for difficulty urinating, frequency and urgency.   Musculoskeletal: Positive for arthralgias (joints). Negative for back pain, myalgias, neck pain and neck stiffness.   Skin: Negative.  Negative for rash and wound.   Allergic/Immunologic: Positive for environmental allergies (seasonal allergies). Negative for food allergies.   Neurological: Negative.  Negative for dizziness, syncope, weakness, light-headedness and headaches.   Hematological: Negative.  Does not  "bruise/bleed easily.   Psychiatric/Behavioral: Negative.  Negative for agitation, confusion and sleep disturbance (denies waking up smothering/SOA). The patient is not nervous/anxious.        Objective   Vitals:    10/03/18 1509   BP: 147/100   BP Location: Left arm   Patient Position: Sitting   Pulse: 80   SpO2: 95%   Weight: (!) 141 kg (310 lb)   Height: 175.3 cm (69\")      /100 (BP Location: Left arm, Patient Position: Sitting)   Pulse 80   Ht 175.3 cm (69\")   Wt (!) 141 kg (310 lb)   SpO2 95%   BMI 45.78 kg/m²    Lab Results (most recent)     None        Physical Exam   Constitutional: She is oriented to person, place, and time. She appears well-developed and well-nourished. No distress.   HENT:   Head: Normocephalic and atraumatic.   Eyes: Pupils are equal, round, and reactive to light. Conjunctivae and EOM are normal.   Neck: Normal range of motion. Neck supple. No JVD present. No tracheal deviation present.   Cardiovascular: Normal rate, regular rhythm, normal heart sounds and intact distal pulses.    Pulmonary/Chest: Effort normal and breath sounds normal.   Abdominal: Soft. Bowel sounds are normal. She exhibits no distension and no mass. There is no tenderness. There is no rebound and no guarding.   Musculoskeletal: Normal range of motion. She exhibits no edema, tenderness or deformity.   Neurological: She is alert and oriented to person, place, and time.   Skin: Skin is warm and dry. No rash noted. No erythema. No pallor.   Psychiatric: She has a normal mood and affect. Her behavior is normal. Judgment and thought content normal.   Nursing note and vitals reviewed.        Procedure   Procedures       Assessment/Plan      Diagnosis Plan   1. Essential hypertension     2. Palpitations     3. Edema, unspecified type       The patient is doing well at this time.  Hypertension continues to be an issue.  I will increase Norvasc to 10 mg daily.  She will continue antihypertensive regimen otherwise.  " Outside of that, she is doing well from cardiovascular standpoint.  I will make no further changes.  I feel that no further evaluation or workup is warranted at this time as she is doing so well.  She will monitor blood pressures closely at home and call for any issues.  Otherwise, we can see her back in 6 months.             Patient's Body mass index is 45.78 kg/m². BMI is above normal parameters. Recommendations include: educational material and referral to primary care.             Electronically signed by:

## 2018-10-15 ENCOUNTER — TELEPHONE (OUTPATIENT)
Dept: CARDIOLOGY | Facility: CLINIC | Age: 33
End: 2018-10-15

## 2018-10-15 DIAGNOSIS — I10 ESSENTIAL HYPERTENSION: Primary | ICD-10-CM

## 2018-10-15 RX ORDER — CARVEDILOL 25 MG/1
25 TABLET ORAL 2 TIMES DAILY
Qty: 60 TABLET | Refills: 5 | Status: SHIPPED | OUTPATIENT
Start: 2018-10-15 | End: 2020-05-12 | Stop reason: SDUPTHER

## 2018-10-15 NOTE — TELEPHONE ENCOUNTER
STATES TAKING NORVASC 5 MG PO BID, METOPROLOL 50 MG PO BID, BUT NOT USING CLONIDINE AND B/P STILL RUNNING 150/98 AND UP /114. V/O PER ANGE ASHBY TO D/C METOPROLOL AND START COREG 25 MG PO BID, MONITOR B/P AND H/R. CALLED AND RELAYED ABOVE TO PATIENT AND SHE VERBALIZED SHE UNDERSTOOD. CHANTEL GREGORIO        ----- Message from CATALINA Rose sent at 10/15/2018  4:43 PM EDT -----      ----- Message -----  From: Debi Lawson MA  Sent: 10/15/2018   1:38 PM  To: CATALINA Rose        ----- Message -----  From: Jada Kurtz  Sent: 10/15/2018  11:56 AM  To: Sue Kothari [facility]     Pt calling to let us know that the changes to her BP meds at her last visit have not changed her BP. She can be reached at [phone]

## 2019-04-03 ENCOUNTER — OFFICE VISIT (OUTPATIENT)
Dept: CARDIOLOGY | Facility: CLINIC | Age: 34
End: 2019-04-03

## 2019-04-03 VITALS
HEART RATE: 71 BPM | OXYGEN SATURATION: 97 % | DIASTOLIC BLOOD PRESSURE: 98 MMHG | HEIGHT: 69 IN | BODY MASS INDEX: 43.4 KG/M2 | WEIGHT: 293 LBS | SYSTOLIC BLOOD PRESSURE: 138 MMHG

## 2019-04-03 DIAGNOSIS — R06.09 DYSPNEA ON EXERTION: ICD-10-CM

## 2019-04-03 DIAGNOSIS — I10 ESSENTIAL HYPERTENSION: Primary | ICD-10-CM

## 2019-04-03 DIAGNOSIS — R07.2 PRECORDIAL PAIN: ICD-10-CM

## 2019-04-03 PROCEDURE — 99213 OFFICE O/P EST LOW 20 MIN: CPT | Performed by: PHYSICIAN ASSISTANT

## 2019-04-03 RX ORDER — HYDROCHLOROTHIAZIDE 25 MG/1
25 TABLET ORAL DAILY
Qty: 30 TABLET | Refills: 11 | Status: SHIPPED | OUTPATIENT
Start: 2019-04-03 | End: 2020-04-23

## 2019-04-03 NOTE — PATIENT INSTRUCTIONS

## 2019-04-03 NOTE — PROGRESS NOTES
Problem list     Subjective   Janeth Hauser is a 33 y.o. female     Chief Complaint   Patient presents with   • Hypertension       HPI  The patient presents in today for routine evaluation follow-up.  We have seen in the past in setting of chest pain, hypertension, and shortness of air.  Previous stress and echo studies were unremarkable.  We have slowly titrated antihypertensive regimen recently.  We have had no success with complete control blood pressures however.  She continues to be hypertensive.  The patient has no continued chest pain.  She has stable dyspnea.  She has infrequent palpitations.  She has no further complaints otherwise.    Current Outpatient Medications   Medication Sig Dispense Refill   • amLODIPine (NORVASC) 10 MG tablet Take 1 tablet by mouth Daily. 90 tablet 3   • carvedilol (COREG) 25 MG tablet Take 1 tablet by mouth 2 (Two) Times a Day. 60 tablet 5   • CloNIDine (CATAPRES) 0.1 MG tablet Take 1 tablet by mouth 3 (Three) Times a Day As Needed for High Blood Pressure (> 160/90). 30 tablet 5   • diclofenac-misoprostol (ARTHROTEC 50) 50-0.2 MG EC tablet Take 1 tablet by mouth. Prn h/a's     • levothyroxine (SYNTHROID, LEVOTHROID) 75 MCG tablet Take 75 mcg by mouth Daily.     • metFORMIN ER (GLUCOPHAGE-XR) 500 MG 24 hr tablet Take 1,000 mg by mouth 2 (Two) Times a Day.     • Multiple Vitamins-Minerals (MULTIVITAMIN ADULT PO) Take  by mouth Daily.     • norgestimate-ethinyl estradiol (TRI-SPRINTEC) 0.18/0.215/0.25 MG-35 MCG per tablet Take 1 tablet by mouth Daily.     • pantoprazole (PROTONIX) 40 MG EC tablet Take 40 mg by mouth Daily.     • hydrochlorothiazide (HYDRODIURIL) 25 MG tablet Take 1 tablet by mouth Daily. 30 tablet 11     No current facility-administered medications for this visit.        Penicillins    Past Medical History:   Diagnosis Date   • Hypertension    • Hypothyroidism    • Polycystic disease, ovaries     PCOS       Social History     Socioeconomic History   • Marital  status:      Spouse name: Not on file   • Number of children: Not on file   • Years of education: Not on file   • Highest education level: Not on file   Tobacco Use   • Smoking status: Never Smoker   • Smokeless tobacco: Never Used   Substance and Sexual Activity   • Alcohol use: Yes     Comment: occasional    • Drug use: No   • Sexual activity: Defer       Family History   Problem Relation Age of Onset   • No Known Problems Mother    • Hypertension Father    • Hyperlipidemia Father    • Heart disease Father    • Heart attack Father    • Anemia Father        Review of Systems   Constitutional: Positive for fatigue (easily fatigued).   HENT: Positive for congestion (head congestion from allergies) and rhinorrhea (runny nose from allergies). Negative for sneezing.    Eyes: Positive for visual disturbance (wears glasses daily).   Respiratory: Positive for shortness of breath (easily SOA; worse on exertion ). Negative for cough, chest tightness and wheezing.    Cardiovascular: Positive for palpitations (occasional palpitations) and leg swelling (BLE swelling/edema). Negative for chest pain.   Gastrointestinal: Negative.  Negative for abdominal pain, nausea and vomiting.   Endocrine: Negative.  Negative for cold intolerance and heat intolerance.   Genitourinary: Negative.  Negative for difficulty urinating, frequency and urgency.   Musculoskeletal: Positive for arthralgias (joints). Negative for back pain, myalgias, neck pain and neck stiffness.   Skin: Negative.  Negative for rash and wound.   Allergic/Immunologic: Negative.  Negative for environmental allergies and food allergies.   Neurological: Negative.  Negative for dizziness, syncope, weakness, light-headedness and headaches.   Hematological: Negative.  Does not bruise/bleed easily.   Psychiatric/Behavioral: Negative.  Negative for agitation, confusion and sleep disturbance (denies waking up smothering/SOA). The patient is not nervous/anxious.   "      Objective   Vitals:    04/03/19 1512   BP: 138/98   BP Location: Left arm   Patient Position: Sitting   Pulse: 71   SpO2: 97%   Weight: (!) 139 kg (307 lb 3.2 oz)   Height: 175.3 cm (69\")      /98 (BP Location: Left arm, Patient Position: Sitting)   Pulse 71   Ht 175.3 cm (69\")   Wt (!) 139 kg (307 lb 3.2 oz)   SpO2 97%   BMI 45.37 kg/m²    Lab Results (most recent)     None        Physical Exam   Constitutional: She is oriented to person, place, and time. She appears well-developed and well-nourished. No distress.   HENT:   Head: Normocephalic and atraumatic.   Eyes: Conjunctivae and EOM are normal. Pupils are equal, round, and reactive to light.   Neck: Normal range of motion. Neck supple. No JVD present. No tracheal deviation present.   Cardiovascular: Normal rate, regular rhythm, normal heart sounds and intact distal pulses.   Pulmonary/Chest: Effort normal and breath sounds normal.   Abdominal: Soft. Bowel sounds are normal. She exhibits no distension and no mass. There is no tenderness. There is no rebound and no guarding.   Musculoskeletal: Normal range of motion. She exhibits no edema, tenderness or deformity.   Neurological: She is alert and oriented to person, place, and time.   Skin: Skin is warm and dry. No rash noted. No erythema. No pallor.   Psychiatric: She has a normal mood and affect. Her behavior is normal. Judgment and thought content normal.   Nursing note and vitals reviewed.        Procedure   Procedures       Assessment/Plan      Diagnosis Plan   1. Essential hypertension     2. Dyspnea on exertion     3. Precordial pain           I will add HCTZ to current antihypertensive regimen to see if we can have some improvement in hypertensive control.  She will need a BMP given institution of that within the next 2-4 weeks.  She will pain that with her primary care provider.  Otherwise, the patient has had a recent lower stress test and echo study.  I feel nothing further is " indicated as she feels well for the most part.  She does tell me that her chest pain is largely resolved.  Her dyspnea is minimal and at baseline.       Patient's Body mass index is 45.37 kg/m². BMI is above normal parameters. Recommendations include: educational material and referral to primary care.             Electronically signed by:

## 2019-11-12 ENCOUNTER — OFFICE VISIT (OUTPATIENT)
Dept: CARDIOLOGY | Facility: CLINIC | Age: 34
End: 2019-11-12

## 2019-11-12 VITALS
SYSTOLIC BLOOD PRESSURE: 138 MMHG | HEIGHT: 69 IN | HEART RATE: 67 BPM | DIASTOLIC BLOOD PRESSURE: 72 MMHG | BODY MASS INDEX: 43.4 KG/M2 | OXYGEN SATURATION: 96 % | WEIGHT: 293 LBS

## 2019-11-12 DIAGNOSIS — I10 ESSENTIAL HYPERTENSION: Primary | ICD-10-CM

## 2019-11-12 DIAGNOSIS — R06.09 DYSPNEA ON EXERTION: ICD-10-CM

## 2019-11-12 DIAGNOSIS — R00.2 PALPITATIONS: ICD-10-CM

## 2019-11-12 PROCEDURE — 99213 OFFICE O/P EST LOW 20 MIN: CPT | Performed by: PHYSICIAN ASSISTANT

## 2019-11-12 PROCEDURE — 93000 ELECTROCARDIOGRAM COMPLETE: CPT | Performed by: PHYSICIAN ASSISTANT

## 2019-11-12 RX ORDER — CHLORAL HYDRATE 500 MG
1000 CAPSULE ORAL
COMMUNITY
End: 2020-11-30

## 2019-11-12 RX ORDER — AMLODIPINE BESYLATE 5 MG/1
5 TABLET ORAL 2 TIMES DAILY
COMMUNITY
Start: 2019-09-24 | End: 2020-05-12 | Stop reason: SDUPTHER

## 2019-11-12 RX ORDER — TRAMADOL HYDROCHLORIDE 50 MG/1
50 TABLET ORAL AS NEEDED
COMMUNITY
End: 2020-11-30

## 2019-11-12 NOTE — PROGRESS NOTES
Problem list     Subjective   Janeth Hauser is a 34 y.o. female     Chief Complaint   Patient presents with   • Hypertension     Here for a follow up       HPI  The patient presents in today for routine follow-up.  We have seen her in the past in setting of chest pain, palpitations, hypertension, etc.  Work-up after initial evaluation suggested a low risk stress test and an unremarkable echocardiogram.  We have slowly titrated antihypertensive regimen to current dosing as below.  After the addition of hydrochlorothiazide to her medical regimen otherwise, blood pressure seemingly have now normalized on her regimen.  She reports no current chest pain.  She has stable dyspnea.  She has infrequent palpitations.  She has no further complaints otherwise and feels that she is doing well.    Current Outpatient Medications on File Prior to Visit   Medication Sig Dispense Refill   • amLODIPine (NORVASC) 5 MG tablet Take 5 mg by mouth 2 (Two) Times a Day.     • carvedilol (COREG) 25 MG tablet Take 1 tablet by mouth 2 (Two) Times a Day. 60 tablet 5   • CloNIDine (CATAPRES) 0.1 MG tablet Take 1 tablet by mouth 3 (Three) Times a Day As Needed for High Blood Pressure (> 160/90). 30 tablet 5   • hydrochlorothiazide (HYDRODIURIL) 25 MG tablet Take 1 tablet by mouth Daily. 30 tablet 11   • levothyroxine (SYNTHROID, LEVOTHROID) 75 MCG tablet Take 75 mcg by mouth Daily.     • metFORMIN ER (GLUCOPHAGE-XR) 500 MG 24 hr tablet Take 1,000 mg by mouth 2 (Two) Times a Day.     • Multiple Vitamins-Minerals (MULTIVITAMIN ADULT PO) Take  by mouth Daily.     • Omega-3 Fatty Acids (FISH OIL) 1000 MG capsule capsule Take 1,000 mg by mouth Daily With Breakfast.     • traMADol (ULTRAM) 50 MG tablet Take 50 mg by mouth As Needed.     • [DISCONTINUED] amLODIPine (NORVASC) 10 MG tablet Take 1 tablet by mouth Daily. (Patient taking differently: Take 5 mg by mouth 2 (Two) Times a Day.) 90 tablet 3   • [DISCONTINUED] diclofenac-misoprostol (ARTHROTEC 50)  50-0.2 MG EC tablet Take 1 tablet by mouth. Prn h/a's     • [DISCONTINUED] norgestimate-ethinyl estradiol (TRI-SPRINTEC) 0.18/0.215/0.25 MG-35 MCG per tablet Take 1 tablet by mouth Daily.     • [DISCONTINUED] pantoprazole (PROTONIX) 40 MG EC tablet Take 40 mg by mouth Daily.       No current facility-administered medications on file prior to visit.        Penicillins    Past Medical History:   Diagnosis Date   • Hypertension    • Hypothyroidism    • Polycystic disease, ovaries     PCOS       Social History     Socioeconomic History   • Marital status:      Spouse name: Not on file   • Number of children: Not on file   • Years of education: Not on file   • Highest education level: Not on file   Tobacco Use   • Smoking status: Never Smoker   • Smokeless tobacco: Never Used   Substance and Sexual Activity   • Alcohol use: Yes     Comment: occasional    • Drug use: No   • Sexual activity: Defer       Family History   Problem Relation Age of Onset   • No Known Problems Mother    • Hypertension Father    • Hyperlipidemia Father    • Heart disease Father    • Heart attack Father    • Anemia Father        Review of Systems   Constitutional: Negative.  Negative for fatigue.   HENT: Negative.  Negative for congestion, rhinorrhea and sneezing.    Eyes: Positive for visual disturbance (wears glasses/contacts daily).   Respiratory: Positive for shortness of breath (easily SOA ; worse on exertion ). Negative for cough, chest tightness and wheezing.    Cardiovascular: Positive for palpitations (occasional palpitations). Negative for chest pain and leg swelling.   Gastrointestinal: Negative.  Negative for abdominal pain, nausea and vomiting.   Endocrine: Negative.  Negative for cold intolerance and heat intolerance.   Genitourinary: Negative.  Negative for difficulty urinating, frequency and urgency.   Musculoskeletal: Positive for arthralgias (joints). Negative for back pain, neck pain and neck stiffness.   Skin: Negative.   "Negative for rash and wound.   Allergic/Immunologic: Positive for environmental allergies (seasonal allergies). Negative for food allergies.   Neurological: Negative.  Negative for dizziness, syncope, weakness and light-headedness.   Hematological: Negative.  Does not bruise/bleed easily.   Psychiatric/Behavioral: Negative.  Negative for agitation, confusion and sleep disturbance (denies waking up smothering/SOA). The patient is not nervous/anxious.        Objective   Vitals:    11/12/19 1508   BP: 138/72   BP Location: Left arm   Patient Position: Sitting   Pulse: 67   SpO2: 96%   Weight: (!) 142 kg (313 lb)   Height: 175.3 cm (69\")      /72 (BP Location: Left arm, Patient Position: Sitting)   Pulse 67   Ht 175.3 cm (69\")   Wt (!) 142 kg (313 lb)   SpO2 96%   BMI 46.22 kg/m²    Lab Results (most recent)     None        Physical Exam   Constitutional: She is oriented to person, place, and time. She appears well-developed and well-nourished. No distress.   HENT:   Head: Normocephalic and atraumatic.   Eyes: Conjunctivae and EOM are normal. Pupils are equal, round, and reactive to light.   Neck: Normal range of motion. Neck supple. No JVD present. No tracheal deviation present.   Cardiovascular: Normal rate, regular rhythm, normal heart sounds and intact distal pulses.   Pulmonary/Chest: Effort normal and breath sounds normal.   Abdominal: Soft. Bowel sounds are normal. She exhibits no distension and no mass. There is no tenderness. There is no rebound and no guarding.   Musculoskeletal: Normal range of motion. She exhibits no edema, tenderness or deformity.   Neurological: She is alert and oriented to person, place, and time.   Skin: Skin is warm and dry. No rash noted. No erythema. No pallor.   Psychiatric: She has a normal mood and affect. Her behavior is normal. Judgment and thought content normal.   Nursing note and vitals reviewed.        Procedure     ECG 12 Lead  Date/Time: 11/12/2019 3:15 " PM  Performed by: Good [place] PA  Authorized by: Good Green PA   Comparison: compared with previous ECG from 4/26/2017  Comparison to previous ECG: Sinus rhythm at 65, normal axis, no acute changes noted.                 Assessment/Plan      Diagnosis Plan   1. Essential hypertension  ECG 12 Lead   2. Dyspnea on exertion     3. Palpitations       1.  At this time, the patient appears to be doing well.  She reports that blood pressures have normalized on current antihypertensive regimen.  I will continue those without change.  She will monitor blood pressures at home and call to us for any issues.    2.  Symptomatically, she is doing well.  Her dyspnea is minimal.  She reports that her palpitations are now minimal as well.  She has nothing to suggest sustained dysrhythmic activity.    3.  We did review her previous stress and echo findings which were largely unremarkable.    4.  As the patient is doing well, nothing further will continue to see her in 6-month intervals.  She will call for any issues prior to follow-up.            Patient's Body mass index is 46.22 kg/m². BMI is above normal parameters. Recommendations include: educational material and referral to primary care.             Electronically signed by:

## 2019-11-12 NOTE — PATIENT INSTRUCTIONS
"Fat and Cholesterol Restricted Eating Plan  Getting too much fat and cholesterol in your diet may cause health problems. Choosing the right foods helps keep your fat and cholesterol at normal levels. This can keep you from getting certain diseases.  Your doctor may recommend an eating plan that includes:  · Total fat: ______% or less of total calories a day.  · Saturated fat: ______% or less of total calories a day.  · Cholesterol: less than _________mg a day.  · Fiber: ______g a day.  What are tips for following this plan?  Meal planning  · At meals, divide your plate into four equal parts:  ? Fill one-half of your plate with vegetables and green salads.  ? Fill one-fourth of your plate with whole grains.  ? Fill one-fourth of your plate with low-fat (lean) protein foods.  · Eat fish that is high in omega-3 fats at least two times a week. This includes mackerel, tuna, sardines, and salmon.  · Eat foods that are high in fiber, such as whole grains, beans, apples, broccoli, carrots, peas, and barley.  General tips    · Work with your doctor to lose weight if you need to.  · Avoid:  ? Foods with added sugar.  ? Fried foods.  ? Foods with partially hydrogenated oils.  · Limit alcohol intake to no more than 1 drink a day for nonpregnant women and 2 drinks a day for men. One drink equals 12 oz of beer, 5 oz of wine, or 1½ oz of hard liquor.  Reading food labels  · Check food labels for:  ? Trans fats.  ? Partially hydrogenated oils.  ? Saturated fat (g) in each serving.  ? Cholesterol (mg) in each serving.  ? Fiber (g) in each serving.  · Choose foods with healthy fats, such as:  ? Monounsaturated fats.  ? Polyunsaturated fats.  ? Omega-3 fats.  · Choose grain products that have whole grains. Look for the word \"whole\" as the first word in the ingredient list.  Cooking  · Cook foods using low-fat methods. These include baking, boiling, grilling, and broiling.  · Eat more home-cooked foods. Eat at restaurants and buffets " less often.  · Avoid cooking using saturated fats, such as butter, cream, palm oil, palm kernel oil, and coconut oil.  Recommended foods    Fruits  · All fresh, canned (in natural juice), or frozen fruits.  Vegetables  · Fresh or frozen vegetables (raw, steamed, roasted, or grilled). Green salads.  Grains  · Whole grains, such as whole wheat or whole grain breads, crackers, cereals, and pasta. Unsweetened oatmeal, bulgur, barley, quinoa, or brown rice. Corn or whole wheat flour tortillas.  Meats and other protein foods  · Ground beef (85% or leaner), grass-fed beef, or beef trimmed of fat. Skinless chicken or turkey. Ground chicken or turkey. Pork trimmed of fat. All fish and seafood. Egg whites. Dried beans, peas, or lentils. Unsalted nuts or seeds. Unsalted canned beans. Nut butters without added sugar or oil.  Dairy  · Low-fat or nonfat dairy products, such as skim or 1% milk, 2% or reduced-fat cheeses, low-fat and fat-free ricotta or cottage cheese, or plain low-fat and nonfat yogurt.  Fats and oils  · Tub margarine without trans fats. Light or reduced-fat mayonnaise and salad dressings. Avocado. Olive, canola, sesame, or safflower oils.  The items listed above may not be a complete list of foods and beverages you can eat. Contact a dietitian for more information.  Foods to avoid  Fruits  · Canned fruit in heavy syrup. Fruit in cream or butter sauce. Fried fruit.  Vegetables  · Vegetables cooked in cheese, cream, or butter sauce. Fried vegetables.  Grains  · White bread. White pasta. White rice. Cornbread. Bagels, pastries, and croissants. Crackers and snack foods that contain trans fat and hydrogenated oils.  Meats and other protein foods  · Fatty cuts of meat. Ribs, chicken wings, cantrell, sausage, bologna, salami, chitterlings, fatback, hot dogs, bratwurst, and packaged lunch meats. Liver and organ meats. Whole eggs and egg yolks. Chicken and turkey with skin. Fried meat.  Dairy  · Whole or 2% milk, cream,  half-and-half, and cream cheese. Whole milk cheeses. Whole-fat or sweetened yogurt. Full-fat cheeses. Nondairy creamers and whipped toppings. Processed cheese, cheese spreads, and cheese curds.  Beverages  · Alcohol. Sugar-sweetened drinks such as sodas, lemonade, and fruit drinks.  Fats and oils  · Butter, stick margarine, lard, shortening, ghee, or cantrell fat. Coconut, palm kernel, and palm oils.  Sweets and desserts  · Corn syrup, sugars, honey, and molasses. Candy. Jam and jelly. Syrup. Sweetened cereals. Cookies, pies, cakes, donuts, muffins, and ice cream.  The items listed above may not be a complete list of foods and beverages you should avoid. Contact a dietitian for more information.  Summary  · Choosing the right foods helps keep your fat and cholesterol at normal levels. This can keep you from getting certain diseases.  · At meals, fill one-half of your plate with vegetables and green salads.  · Eat high-fiber foods, like whole grains, beans, apples, carrots, peas, and barley.  · Limit added sugar, saturated fats, alcohol, and fried foods.  This information is not intended to replace advice given to you by your health care provider. Make sure you discuss any questions you have with your health care provider.  Document Released: 06/18/2013 Document Revised: 08/21/2019 Document Reviewed: 09/04/2018  [employer] Patient Education © 2019 [employer]  BMI for Adults    Body mass index (BMI) is a number that is calculated from a person's weight and height. BMI may help to estimate how much of a person's weight is composed of fat. BMI can help identify those who may be at higher risk for certain medical problems.  How is BMI used with adults?  BMI is used as a screening tool to identify possible weight problems. It is used to check whether a person is obese, overweight, healthy weight, or underweight.  How is BMI calculated?  BMI measures your weight and compares it to your height. This can be done  "either in English ([place] or metric measurements. Note that charts are available to help you find your BMI quickly and easily without having to do these calculations yourself.  To calculate your BMI in English ([place] measurements, your health care provider will:  1. Measure your weight in pounds (lb).  2. Multiply the number of pounds by 703.  ? For example, for a person who weighs 180 lb, multiply that number by 703, which equals 126,540.  3. Measure your height in inches (in). Then multiply that number by itself to get a measurement called \"inches squared.\"  ? For example, for a person who is 70 in tall, the \"inches squared\" measurement is 70 in x 70 in, which equals 4900 inches squared.  4. Divide the total from Step 2 (number of lb x 703) by the total from Step 3 (inches squared): 126,540 ÷ 4900 = 25.8. This is your BMI.  To calculate your BMI in metric measurements, your health care provider will:  1. Measure your weight in kilograms (kg).  2. Measure your height in meters (m). Then multiply that number by itself to get a measurement called \"meters squared.\"  ? For example, for a person who is 1.75 m tall, the \"meters squared\" measurement is 1.75 m x 1.75 m, which is equal to 3.1 meters squared.  3. Divide the number of kilograms (your weight) by the meters squared number. In this example: 70 ÷ 3.1 = 22.6. This is your BMI.  How is BMI interpreted?  To interpret your results, your health care provider will use BMI charts to identify whether you are underweight, normal weight, overweight, or obese. The following guidelines will be used:  · Underweight: BMI less than 18.5.  · Normal weight: BMI between 18.5 and 24.9.  · Overweight: BMI between 25 and 29.9.  · Obese: BMI of 30 and above.  Please note:  · Weight includes both fat and muscle, so someone with a muscular build, such as an athlete, may have a BMI that is higher than 24.9. In cases like these, BMI is not an accurate measure of body fat.  · To determine " if excess body fat is the cause of a BMI of 25 or higher, further assessments may need to be done by a health care provider.  · BMI is usually interpreted in the same way for men and women.  Why is BMI a useful tool?  BMI is useful in two ways:  · Identifying a weight problem that may be related to a medical condition, or that may increase the risk for medical problems.  · Promoting lifestyle and diet changes in order to reach a healthy weight.  Summary  · Body mass index (BMI) is a number that is calculated from a person's weight and height.  · BMI may help to estimate how much of a person's weight is composed of fat. BMI can help identify those who may be at higher risk for certain medical problems.  · BMI can be measured using English measurements or metric measurements.  · To interpret your results, your health care provider will use BMI charts to identify whether you are underweight, normal weight, overweight, or obese.  This information is not intended to replace advice given to you by your health care provider. Make sure you discuss any questions you have with your health care provider.  Document Released: 08/29/2005 Document Revised: 10/31/2018 Document Reviewed: 10/31/2018  [employer] Interactive Patient Education © 2019 [employer]

## 2020-04-23 RX ORDER — HYDROCHLOROTHIAZIDE 25 MG/1
TABLET ORAL
Qty: 90 TABLET | Refills: 0 | Status: SHIPPED | OUTPATIENT
Start: 2020-04-23 | End: 2020-05-12 | Stop reason: SDUPTHER

## 2020-05-12 ENCOUNTER — OFFICE VISIT (OUTPATIENT)
Dept: CARDIOLOGY | Facility: CLINIC | Age: 35
End: 2020-05-12

## 2020-05-12 VITALS
BODY MASS INDEX: 43.4 KG/M2 | TEMPERATURE: 97.1 F | OXYGEN SATURATION: 98 % | WEIGHT: 293 LBS | HEART RATE: 74 BPM | HEIGHT: 69 IN | SYSTOLIC BLOOD PRESSURE: 122 MMHG | DIASTOLIC BLOOD PRESSURE: 78 MMHG

## 2020-05-12 DIAGNOSIS — R53.83 OTHER FATIGUE: ICD-10-CM

## 2020-05-12 DIAGNOSIS — R06.09 DYSPNEA ON EXERTION: Primary | ICD-10-CM

## 2020-05-12 DIAGNOSIS — I10 ESSENTIAL HYPERTENSION: ICD-10-CM

## 2020-05-12 PROCEDURE — 99213 OFFICE O/P EST LOW 20 MIN: CPT | Performed by: PHYSICIAN ASSISTANT

## 2020-05-12 RX ORDER — MEDROXYPROGESTERONE ACETATE 10 MG/1
10 TABLET ORAL DAILY PRN
COMMUNITY
End: 2020-11-30

## 2020-05-12 RX ORDER — DICLOFENAC POTASSIUM 50 MG/1
50 TABLET, FILM COATED ORAL 2 TIMES DAILY PRN
COMMUNITY

## 2020-05-12 RX ORDER — CARVEDILOL 25 MG/1
25 TABLET ORAL 2 TIMES DAILY
Qty: 180 TABLET | Refills: 3 | Status: SHIPPED | OUTPATIENT
Start: 2020-05-12 | End: 2020-11-30 | Stop reason: SDUPTHER

## 2020-05-12 RX ORDER — CLONIDINE HYDROCHLORIDE 0.1 MG/1
0.1 TABLET ORAL 3 TIMES DAILY PRN
Qty: 30 TABLET | Refills: 1 | Status: SHIPPED | OUTPATIENT
Start: 2020-05-12

## 2020-05-12 RX ORDER — HYDROCHLOROTHIAZIDE 25 MG/1
25 TABLET ORAL DAILY
Qty: 90 TABLET | Refills: 3 | Status: SHIPPED | OUTPATIENT
Start: 2020-05-12 | End: 2020-11-30

## 2020-05-12 RX ORDER — AMLODIPINE BESYLATE 5 MG/1
5 TABLET ORAL 2 TIMES DAILY
Qty: 180 TABLET | Refills: 3 | Status: SHIPPED | OUTPATIENT
Start: 2020-05-12 | End: 2020-11-30 | Stop reason: SDUPTHER

## 2020-05-12 NOTE — PATIENT INSTRUCTIONS
"Fat and Cholesterol Restricted Eating Plan  Getting too much fat and cholesterol in your diet may cause health problems. Choosing the right foods helps keep your fat and cholesterol at normal levels. This can keep you from getting certain diseases.  Your doctor may recommend an eating plan that includes:  · Total fat: ______% or less of total calories a day.  · Saturated fat: ______% or less of total calories a day.  · Cholesterol: less than _________mg a day.  · Fiber: ______g a day.  What are tips for following this plan?  Meal planning  · At meals, divide your plate into four equal parts:  ? Fill one-half of your plate with vegetables and green salads.  ? Fill one-fourth of your plate with whole grains.  ? Fill one-fourth of your plate with low-fat (lean) protein foods.  · Eat fish that is high in omega-3 fats at least two times a week. This includes mackerel, tuna, sardines, and salmon.  · Eat foods that are high in fiber, such as whole grains, beans, apples, broccoli, carrots, peas, and barley.  General tips    · Work with your doctor to lose weight if you need to.  · Avoid:  ? Foods with added sugar.  ? Fried foods.  ? Foods with partially hydrogenated oils.  · Limit alcohol intake to no more than 1 drink a day for nonpregnant women and 2 drinks a day for men. One drink equals 12 oz of beer, 5 oz of wine, or 1½ oz of hard liquor.  Reading food labels  · Check food labels for:  ? Trans fats.  ? Partially hydrogenated oils.  ? Saturated fat (g) in each serving.  ? Cholesterol (mg) in each serving.  ? Fiber (g) in each serving.  · Choose foods with healthy fats, such as:  ? Monounsaturated fats.  ? Polyunsaturated fats.  ? Omega-3 fats.  · Choose grain products that have whole grains. Look for the word \"whole\" as the first word in the ingredient list.  Cooking  · Cook foods using low-fat methods. These include baking, boiling, grilling, and broiling.  · Eat more home-cooked foods. Eat at restaurants and buffets " less often.  · Avoid cooking using saturated fats, such as butter, cream, palm oil, palm kernel oil, and coconut oil.  Recommended foods    Fruits  · All fresh, canned (in natural juice), or frozen fruits.  Vegetables  · Fresh or frozen vegetables (raw, steamed, roasted, or grilled). Green salads.  Grains  · Whole grains, such as whole wheat or whole grain breads, crackers, cereals, and pasta. Unsweetened oatmeal, bulgur, barley, quinoa, or brown rice. Corn or whole wheat flour tortillas.  Meats and other protein foods  · Ground beef (85% or leaner), grass-fed beef, or beef trimmed of fat. Skinless chicken or turkey. Ground chicken or turkey. Pork trimmed of fat. All fish and seafood. Egg whites. Dried beans, peas, or lentils. Unsalted nuts or seeds. Unsalted canned beans. Nut butters without added sugar or oil.  Dairy  · Low-fat or nonfat dairy products, such as skim or 1% milk, 2% or reduced-fat cheeses, low-fat and fat-free ricotta or cottage cheese, or plain low-fat and nonfat yogurt.  Fats and oils  · Tub margarine without trans fats. Light or reduced-fat mayonnaise and salad dressings. Avocado. Olive, canola, sesame, or safflower oils.  The items listed above may not be a complete list of foods and beverages you can eat. Contact a dietitian for more information.  Foods to avoid  Fruits  · Canned fruit in heavy syrup. Fruit in cream or butter sauce. Fried fruit.  Vegetables  · Vegetables cooked in cheese, cream, or butter sauce. Fried vegetables.  Grains  · White bread. White pasta. White rice. Cornbread. Bagels, pastries, and croissants. Crackers and snack foods that contain trans fat and hydrogenated oils.  Meats and other protein foods  · Fatty cuts of meat. Ribs, chicken wings, cantrell, sausage, bologna, salami, chitterlings, fatback, hot dogs, bratwurst, and packaged lunch meats. Liver and organ meats. Whole eggs and egg yolks. Chicken and turkey with skin. Fried meat.  Dairy  · Whole or 2% milk, cream,  half-and-half, and cream cheese. Whole milk cheeses. Whole-fat or sweetened yogurt. Full-fat cheeses. Nondairy creamers and whipped toppings. Processed cheese, cheese spreads, and cheese curds.  Beverages  · Alcohol. Sugar-sweetened drinks such as sodas, lemonade, and fruit drinks.  Fats and oils  · Butter, stick margarine, lard, shortening, ghee, or cantrell fat. Coconut, palm kernel, and palm oils.  Sweets and desserts  · Corn syrup, sugars, honey, and molasses. Candy. Jam and jelly. Syrup. Sweetened cereals. Cookies, pies, cakes, donuts, muffins, and ice cream.  The items listed above may not be a complete list of foods and beverages you should avoid. Contact a dietitian for more information.  Summary  · Choosing the right foods helps keep your fat and cholesterol at normal levels. This can keep you from getting certain diseases.  · At meals, fill one-half of your plate with vegetables and green salads.  · Eat high-fiber foods, like whole grains, beans, apples, carrots, peas, and barley.  · Limit added sugar, saturated fats, alcohol, and fried foods.  This information is not intended to replace advice given to you by your health care provider. Make sure you discuss any questions you have with your health care provider.  Document Released: 06/18/2013 Document Revised: 08/21/2019 Document Reviewed: 09/04/2018  [email or web address] Interactive Patient Education © 2020 [employer]  BMI for Adults    Body mass index (BMI) is a number that is calculated from a person's weight and height. BMI may help to estimate how much of a person's weight is composed of fat. BMI can help identify those who may be at higher risk for certain medical problems.  How is BMI used with adults?  BMI is used as a screening tool to identify possible weight problems. It is used to check whether a person is obese, overweight, healthy weight, or underweight.  How is BMI calculated?  BMI measures your weight and compares it to your height. This can be done  "either in English ([place] or metric measurements. Note that charts are available to help you find your BMI quickly and easily without having to do these calculations yourself.  To calculate your BMI in English ([place] measurements, your health care provider will:  1. Measure your weight in pounds (lb).  2. Multiply the number of pounds by 703.  ? For example, for a person who weighs 180 lb, multiply that number by 703, which equals 126,540.  3. Measure your height in inches (in). Then multiply that number by itself to get a measurement called \"inches squared.\"  ? For example, for a person who is 70 in tall, the \"inches squared\" measurement is 70 in x 70 in, which equals 4900 inches squared.  4. Divide the total from Step 2 (number of lb x 703) by the total from Step 3 (inches squared): 126,540 ÷ 4900 = 25.8. This is your BMI.  To calculate your BMI in metric measurements, your health care provider will:  1. Measure your weight in kilograms (kg).  2. Measure your height in meters (m). Then multiply that number by itself to get a measurement called \"meters squared.\"  ? For example, for a person who is 1.75 m tall, the \"meters squared\" measurement is 1.75 m x 1.75 m, which is equal to 3.1 meters squared.  3. Divide the number of kilograms (your weight) by the meters squared number. In this example: 70 ÷ 3.1 = 22.6. This is your BMI.  How is BMI interpreted?  To interpret your results, your health care provider will use BMI charts to identify whether you are underweight, normal weight, overweight, or obese. The following guidelines will be used:  · Underweight: BMI less than 18.5.  · Normal weight: BMI between 18.5 and 24.9.  · Overweight: BMI between 25 and 29.9.  · Obese: BMI of 30 and above.  Please note:  · Weight includes both fat and muscle, so someone with a muscular build, such as an athlete, may have a BMI that is higher than 24.9. In cases like these, BMI is not an accurate measure of body fat.  · To determine " if excess body fat is the cause of a BMI of 25 or higher, further assessments may need to be done by a health care provider.  · BMI is usually interpreted in the same way for men and women.  Why is BMI a useful tool?  BMI is useful in two ways:  · Identifying a weight problem that may be related to a medical condition, or that may increase the risk for medical problems.  · Promoting lifestyle and diet changes in order to reach a healthy weight.  Summary  · Body mass index (BMI) is a number that is calculated from a person's weight and height.  · BMI may help to estimate how much of a person's weight is composed of fat. BMI can help identify those who may be at higher risk for certain medical problems.  · BMI can be measured using English measurements or metric measurements.  · To interpret your results, your health care provider will use BMI charts to identify whether you are underweight, normal weight, overweight, or obese.  This information is not intended to replace advice given to you by your health care provider. Make sure you discuss any questions you have with your health care provider.  Document Released: 08/29/2005 Document Revised: 10/31/2018 Document Reviewed: 10/31/2018  [employer] Interactive Patient Education © 2020 [employer]

## 2020-05-12 NOTE — PROGRESS NOTES
Problem list     Subjective   Janeth Hauser is a 34 y.o. female     Chief Complaint   Patient presents with   • Hypertension     Here for a 6 month follow up   • Palpitations       HPI  The patient presents in today a for routine evaluation and follow-up.  This pleasant patient was seen historically because of chest discomfort and symptoms otherwise.  Follow-up stress and echo studies were benign.  We have slowly adjusted antihypertensive therapies as well which eventually has resulted in normotensive status.  She has edema from her amlodipine she has fatigue from her beta-blocker at times.  For the most part however, she is tolerating medications and reports adequate control of hypertension, as long she is compliant with therapy.  Symptomatically, the patient reports that her chest pain is resolved.  She has stable dyspnea and fatigue.  She has no failure or dysrhythmic symptoms.  She has no further complaints.    Current Outpatient Medications on File Prior to Visit   Medication Sig Dispense Refill   • amLODIPine (NORVASC) 5 MG tablet Take 5 mg by mouth 2 (Two) Times a Day.     • carvedilol (COREG) 25 MG tablet Take 1 tablet by mouth 2 (Two) Times a Day. 60 tablet 5   • clomiPHENE Citrate (CLOMID PO) Take 1 tablet by mouth Daily As Needed.     • CloNIDine (CATAPRES) 0.1 MG tablet Take 1 tablet by mouth 3 (Three) Times a Day As Needed for High Blood Pressure (> 160/90). 30 tablet 5   • diclofenac (CATAFLAM) 50 MG tablet Take 50 mg by mouth 2 (Two) Times a Day As Needed.     • hydroCHLOROthiazide (HYDRODIURIL) 25 MG tablet Take 1 tablet by mouth once daily 90 tablet 0   • levothyroxine (SYNTHROID, LEVOTHROID) 75 MCG tablet Take 75 mcg by mouth Daily.     • medroxyPROGESTERone (PROVERA) 10 MG tablet Take 10 mg by mouth Daily As Needed.     • metFORMIN ER (GLUCOPHAGE-XR) 500 MG 24 hr tablet Take 1,000 mg by mouth 2 (Two) Times a Day.     • Multiple Vitamins-Minerals (MULTIVITAMIN ADULT PO) Take  by mouth Daily.     •  Omega-3 Fatty Acids (FISH OIL) 1000 MG capsule capsule Take 1,000 mg by mouth Daily With Breakfast.     • traMADol (ULTRAM) 50 MG tablet Take 50 mg by mouth As Needed.       No current facility-administered medications on file prior to visit.        Penicillins    Past Medical History:   Diagnosis Date   • Hypertension    • Hypothyroidism    • Polycystic disease, ovaries     PCOS       Social History     Socioeconomic History   • Marital status:      Spouse name: Not on file   • Number of children: Not on file   • Years of education: Not on file   • Highest education level: Not on file   Tobacco Use   • Smoking status: Never Smoker   • Smokeless tobacco: Never Used   Substance and Sexual Activity   • Alcohol use: Yes     Comment: occasional    • Drug use: No   • Sexual activity: Defer       Family History   Problem Relation Age of Onset   • No Known Problems Mother    • Hypertension Father    • Hyperlipidemia Father    • Heart disease Father    • Heart attack Father    • Anemia Father        Review of Systems   Constitutional: Negative.  Negative for chills, fatigue and fever.   HENT: Positive for congestion (seasonal ). Negative for rhinorrhea and sore throat.    Eyes: Positive for visual disturbance (glasses daily ).   Respiratory: Negative.  Negative for chest tightness and shortness of breath.    Cardiovascular: Positive for leg swelling (Occasional puffiness ). Negative for chest pain and palpitations.   Gastrointestinal: Negative.  Negative for abdominal pain, blood in stool, nausea and vomiting.   Endocrine: Negative.  Negative for cold intolerance and heat intolerance.   Genitourinary: Negative.  Negative for dysuria, frequency, hematuria and urgency.   Musculoskeletal: Positive for arthralgias (joints ) and back pain (low back pain ).   Skin: Negative.  Negative for rash and wound.   Allergic/Immunologic: Positive for environmental allergies. Negative for food allergies.   Neurological: Negative.   "Negative for dizziness, weakness and light-headedness.   Hematological: Negative.  Does not bruise/bleed easily.   Psychiatric/Behavioral: Negative.  Negative for agitation, confusion and sleep disturbance (denies waking with smothering ). The patient is not nervous/anxious.        Objective   Vitals:    05/12/20 1504   BP: 122/78   BP Location: Left arm   Patient Position: Sitting   Pulse: 74   Temp: 97.1 °F (36.2 °C)   SpO2: 98%   Weight: (!) 144 kg (317 lb)   Height: 175.3 cm (69\")      /78 (BP Location: Left arm, Patient Position: Sitting)   Pulse 74   Temp 97.1 °F (36.2 °C)   Ht 175.3 cm (69\")   Wt (!) 144 kg (317 lb)   SpO2 98%   BMI 46.81 kg/m²    Lab Results (most recent)     None        Physical Exam   Constitutional: She is oriented to person, place, and time. She appears well-developed and well-nourished. No distress.   HENT:   Head: Normocephalic and atraumatic.   Eyes: Pupils are equal, round, and reactive to light. Conjunctivae and EOM are normal.   Neck: Normal range of motion. Neck supple. No JVD present. No tracheal deviation present.   Cardiovascular: Normal rate, regular rhythm, normal heart sounds and intact distal pulses.   Pulmonary/Chest: Effort normal and breath sounds normal.   Abdominal: Soft. Bowel sounds are normal. She exhibits no distension and no mass. There is no tenderness. There is no rebound and no guarding.   Musculoskeletal: Normal range of motion. She exhibits no edema, tenderness or deformity.   Neurological: She is alert and oriented to person, place, and time.   Skin: Skin is warm and dry. No rash noted. No erythema. No pallor.   Psychiatric: She has a normal mood and affect. Her behavior is normal. Judgment and thought content normal.   Nursing note and vitals reviewed.        Procedure   Procedures       Assessment/Plan      Diagnosis Plan   1. Dyspnea on exertion     2. Essential hypertension  carvedilol (COREG) 25 MG tablet   3. Other fatigue       1.  The " patient reports stable symptoms of dyspnea and fatigue.  Her previously described episodes of chest pain have resolved.  She has no failure or dysrhythmic symptoms of any significance at this time.    2.  Previous work-up including stress and echo studies, and evaluation otherwise from cardiovascular standpoint has been benign.  I do not feel further work-up would be warranted at this time.    3.  The patient is now normotensive on current antihypertensive therapies.  I will continue those without change.  We did give her refills of her requested medications.  Those have been sent to her pharmacy.    4.  In that setting, as the patient is doing well, nothing further.  We will continue to see her on 6-month intervals.           Janeth Hauser  reports that she has never smoked. She has never used smokeless tobacco..         Patient's Body mass index is 46.81 kg/m². BMI is above normal parameters. Recommendations include: educational material and referral to primary care.             Electronically signed by:

## 2020-11-30 ENCOUNTER — OFFICE VISIT (OUTPATIENT)
Dept: CARDIOLOGY | Facility: CLINIC | Age: 35
End: 2020-11-30

## 2020-11-30 VITALS
OXYGEN SATURATION: 96 % | SYSTOLIC BLOOD PRESSURE: 141 MMHG | HEIGHT: 69 IN | TEMPERATURE: 98.4 F | HEART RATE: 82 BPM | DIASTOLIC BLOOD PRESSURE: 86 MMHG | WEIGHT: 293 LBS | BODY MASS INDEX: 43.4 KG/M2

## 2020-11-30 DIAGNOSIS — R06.09 DYSPNEA ON EXERTION: ICD-10-CM

## 2020-11-30 DIAGNOSIS — I10 ESSENTIAL HYPERTENSION: Primary | ICD-10-CM

## 2020-11-30 DIAGNOSIS — R00.2 PALPITATIONS: ICD-10-CM

## 2020-11-30 PROCEDURE — 99213 OFFICE O/P EST LOW 20 MIN: CPT | Performed by: PHYSICIAN ASSISTANT

## 2020-11-30 PROCEDURE — 93000 ELECTROCARDIOGRAM COMPLETE: CPT | Performed by: PHYSICIAN ASSISTANT

## 2020-11-30 RX ORDER — PRENATAL VIT NO.126/IRON/FOLIC 28MG-0.8MG
TABLET ORAL DAILY
COMMUNITY
End: 2022-04-25

## 2020-11-30 RX ORDER — CETIRIZINE HYDROCHLORIDE 10 MG/1
10 TABLET ORAL DAILY
COMMUNITY
Start: 2020-08-30 | End: 2022-12-06 | Stop reason: SDUPTHER

## 2020-11-30 RX ORDER — ASPIRIN 81 MG/1
81 TABLET ORAL DAILY
COMMUNITY

## 2020-11-30 RX ORDER — AMLODIPINE BESYLATE 10 MG/1
TABLET ORAL DAILY
COMMUNITY
Start: 2020-09-17 | End: 2020-11-30 | Stop reason: SDUPTHER

## 2020-11-30 RX ORDER — AMLODIPINE BESYLATE 10 MG/1
10 TABLET ORAL DAILY
Qty: 90 TABLET | Refills: 3 | Status: SHIPPED | OUTPATIENT
Start: 2020-11-30 | End: 2022-01-24

## 2020-11-30 RX ORDER — CARVEDILOL 25 MG/1
25 TABLET ORAL 2 TIMES DAILY
Qty: 180 TABLET | Refills: 3 | Status: SHIPPED | OUTPATIENT
Start: 2020-11-30 | End: 2022-04-25 | Stop reason: SDUPTHER

## 2020-11-30 NOTE — PATIENT INSTRUCTIONS

## 2020-11-30 NOTE — PROGRESS NOTES
Problem list     Subjective   Janeth Hauser is a 35 y.o. female     Chief Complaint   Patient presents with   • Hypertension     presents for 6 month f/u   • Shortness of Breath       HPI  The patient presents in today for routine, 6-month follow-up.  We have seen her historically because of chest discomfort and symptoms otherwise.  We have also followed her because of hypertension.  The patient has been evaluated previously with stress test and echo studies.  Those studies were benign and basically unremarkable.  We had adjusted antihypertensive therapies previously and achieved normotensive status.  Medications were held through her obstetrician however because of pregnant status.  She is currently almost 30 weeks pregnant.  Blood pressures have now averaged mid 140s over mid 80s, occasionally higher.  The patient reports no dysrhythmic symptoms otherwise for now.  She has stable dyspnea and fatigue.  She denies chest pain.  She has no further complaints.    Current Outpatient Medications on File Prior to Visit   Medication Sig Dispense Refill   • amLODIPine (NORVASC) 10 MG tablet Daily.     • aspirin (aspirin) 81 MG EC tablet Take 81 mg by mouth Daily.     • carvedilol (COREG) 25 MG tablet Take 1 tablet by mouth 2 (Two) Times a Day. 180 tablet 3   • cetirizine (zyrTEC) 10 MG tablet Take 10 mg by mouth Daily.     • cloNIDine (CATAPRES) 0.1 MG tablet Take 1 tablet by mouth 3 (Three) Times a Day As Needed for High Blood Pressure (> 160/90). 30 tablet 1   • diclofenac (CATAFLAM) 50 MG tablet Take 50 mg by mouth 2 (Two) Times a Day As Needed.     • levothyroxine (SYNTHROID, LEVOTHROID) 75 MCG tablet Take 75 mcg by mouth Daily.     • metFORMIN ER (GLUCOPHAGE-XR) 500 MG 24 hr tablet Take 1,000 mg by mouth 2 (Two) Times a Day.     • prenatal vitamin (prenatal, CLASSIC, vitamin) tablet Take  by mouth Daily.     • [DISCONTINUED] amLODIPine (NORVASC) 5 MG tablet Take 1 tablet by mouth 2 (Two) Times a Day. (Patient taking  differently: Take 5 mg by mouth 2 (two) times a day.) 180 tablet 3   • [DISCONTINUED] clomiPHENE Citrate (CLOMID PO) Take 1 tablet by mouth Daily As Needed.     • [DISCONTINUED] hydroCHLOROthiazide (HYDRODIURIL) 25 MG tablet Take 1 tablet by mouth Daily. 90 tablet 3   • [DISCONTINUED] medroxyPROGESTERone (PROVERA) 10 MG tablet Take 10 mg by mouth Daily As Needed.     • [DISCONTINUED] Multiple Vitamins-Minerals (MULTIVITAMIN ADULT PO) Take  by mouth Daily.     • [DISCONTINUED] Omega-3 Fatty Acids (FISH OIL) 1000 MG capsule capsule Take 1,000 mg by mouth Daily With Breakfast.     • [DISCONTINUED] traMADol (ULTRAM) 50 MG tablet Take 50 mg by mouth As Needed.       No current facility-administered medications on file prior to visit.        Penicillins    Past Medical History:   Diagnosis Date   • Hypertension    • Hypothyroidism    • Polycystic disease, ovaries     PCOS       Social History     Socioeconomic History   • Marital status:      Spouse name: Not on file   • Number of children: Not on file   • Years of education: Not on file   • Highest education level: Not on file   Tobacco Use   • Smoking status: Never Smoker   • Smokeless tobacco: Never Used   Substance and Sexual Activity   • Alcohol use: Yes     Comment: occasional    • Drug use: No   • Sexual activity: Defer       Family History   Problem Relation Age of Onset   • No Known Problems Mother    • Hypertension Father    • Hyperlipidemia Father    • Heart disease Father    • Heart attack Father    • Anemia Father        Review of Systems   Constitutional: Negative.  Negative for chills, diaphoresis, fatigue and fever.   HENT: Negative.    Eyes: Positive for visual disturbance.   Respiratory: Positive for shortness of breath (with increased activity). Negative for apnea, cough, chest tightness and wheezing.    Cardiovascular: Positive for leg swelling (pregnancy, L more than R). Negative for chest pain and palpitations (only when medication is  "skipped).   Gastrointestinal: Negative.  Negative for abdominal pain, blood in stool, constipation, diarrhea, nausea and vomiting.   Endocrine: Negative.    Genitourinary: Negative.  Negative for hematuria.   Musculoskeletal: Positive for arthralgias, back pain and neck pain. Negative for myalgias.   Skin: Negative.    Allergic/Immunologic: Positive for environmental allergies. Negative for food allergies.   Neurological: Negative.  Negative for dizziness, syncope, weakness, light-headedness and headaches.   Hematological: Negative.  Does not bruise/bleed easily.   Psychiatric/Behavioral: Positive for sleep disturbance (insomnia). Negative for agitation. The patient is not nervous/anxious.        Objective   Vitals:    11/30/20 1543   BP: 141/86   BP Location: Left arm   Patient Position: Sitting   Pulse: 82   Temp: 98.4 °F (36.9 °C)   SpO2: 96%   Weight: (!) 148 kg (325 lb 12.8 oz)   Height: 175.3 cm (69.02\")      /86 (BP Location: Left arm, Patient Position: Sitting)   Pulse 82   Temp 98.4 °F (36.9 °C)   Ht 175.3 cm (69.02\")   Wt (!) 148 kg (325 lb 12.8 oz) Comment: 30 wks pregnant  SpO2 96%   BMI 48.09 kg/m²    Lab Results (most recent)     None        Physical Exam  Vitals signs and nursing note reviewed.   Constitutional:       General: She is not in acute distress (Patient is currently pregnant.).     Appearance: She is well-developed.   HENT:      Head: Normocephalic and atraumatic.   Eyes:      Conjunctiva/sclera: Conjunctivae normal.      Pupils: Pupils are equal, round, and reactive to light.   Neck:      Musculoskeletal: Normal range of motion and neck supple.      Vascular: No JVD.      Trachea: No tracheal deviation.   Cardiovascular:      Rate and Rhythm: Normal rate and regular rhythm.      Heart sounds: Normal heart sounds.   Pulmonary:      Effort: Pulmonary effort is normal.      Breath sounds: Normal breath sounds.   Abdominal:      General: Bowel sounds are normal. There is no " distension.      Palpations: Abdomen is soft. There is no mass.      Tenderness: There is no abdominal tenderness. There is no guarding or rebound.   Musculoskeletal: Normal range of motion.         General: No tenderness or deformity.   Skin:     General: Skin is warm and dry.      Coloration: Skin is not pale.      Findings: No erythema or rash.   Neurological:      Mental Status: She is alert and oriented to person, place, and time.   Psychiatric:         Behavior: Behavior normal.         Thought Content: Thought content normal.         Judgment: Judgment normal.           Procedure     ECG 12 Lead    Date/Time: 11/30/2020 3:48 PM  Performed by: Good [place] PA  Authorized by: Good Green PA   Comparison: compared with previous ECG from 11/12/2019  Comparison to previous ECG: Sinus rhythm at 78, normal axis, no acute changes noted.                 Assessment/Plan      Diagnosis Plan   1. Essential hypertension  ECG 12 Lead    carvedilol (COREG) 25 MG tablet   2. Palpitations  ECG 12 Lead   3. Dyspnea on exertion  ECG 12 Lead       1.  At this time, the patient appears to be doing well.  She is slightly hypertensive, only after medications were held by her obstetrician.  Those will be instituted once she has delivered.  In the interim, I have asked that she monitor blood pressures closely and call to us for any issues.  I would make no adjustments in medical regimen for now.    2.  Symptomatically, the patient reports symptoms of dyspnea which are stable.  She really never has any dysrhythmic symptoms of any significance at this time.  She has no further cardiovascular issues at this time.    3.  Previous dress and echo studies were benign.  I do not feel further work-up is warranted at this time.    4.  We will continue to see her on 6-month intervals at this time.         Janeth Hauser  reports that she has never smoked. She has never used smokeless tobacco..         Patient's Body mass index is  48.09 kg/m². BMI is above normal parameters. Recommendations include: educational material.             Electronically signed by:

## 2021-10-25 ENCOUNTER — OFFICE VISIT (OUTPATIENT)
Dept: CARDIOLOGY | Facility: CLINIC | Age: 36
End: 2021-10-25

## 2021-10-25 VITALS
WEIGHT: 293 LBS | BODY MASS INDEX: 43.4 KG/M2 | SYSTOLIC BLOOD PRESSURE: 159 MMHG | DIASTOLIC BLOOD PRESSURE: 92 MMHG | HEIGHT: 69 IN | HEART RATE: 74 BPM | OXYGEN SATURATION: 98 %

## 2021-10-25 DIAGNOSIS — R53.83 OTHER FATIGUE: ICD-10-CM

## 2021-10-25 DIAGNOSIS — R06.09 DYSPNEA ON EXERTION: ICD-10-CM

## 2021-10-25 DIAGNOSIS — I10 ESSENTIAL HYPERTENSION: Primary | ICD-10-CM

## 2021-10-25 PROCEDURE — 99213 OFFICE O/P EST LOW 20 MIN: CPT | Performed by: PHYSICIAN ASSISTANT

## 2021-10-25 PROCEDURE — 93000 ELECTROCARDIOGRAM COMPLETE: CPT | Performed by: PHYSICIAN ASSISTANT

## 2021-10-25 RX ORDER — HYDROCHLOROTHIAZIDE 25 MG/1
25 TABLET ORAL DAILY
Qty: 30 TABLET | Refills: 11 | Status: SHIPPED | OUTPATIENT
Start: 2021-10-25 | End: 2022-04-25 | Stop reason: SDUPTHER

## 2021-10-25 NOTE — PATIENT INSTRUCTIONS

## 2021-10-25 NOTE — PROGRESS NOTES
Problem list     Subjective   Janeth Hauser is a 35 y.o. female     Chief Complaint   Patient presents with   • Follow-up     6 month        HPI  The patient presents into the clinic today for routine follow-up.  She did have delivery of her son.  Her cardiac course during pregnancy remain unremarkable.  Some of her antihypertensive therapies were discontinued because of pregnant status.  Now, she is hypertensive.  She is on amlodipine and carvedilol at max dosings.  She has clonidine to utilize as needed.  Average blood pressures are similar to that recorded below.  Symptomatically, she has stable dyspnea and fatigue.  She has no chest pain.  She has no failure or dysrhythmic symptoms.  She has no further complaints otherwise.    Current Outpatient Medications on File Prior to Visit   Medication Sig Dispense Refill   • amLODIPine (NORVASC) 10 MG tablet Take 1 tablet by mouth Daily. 90 tablet 3   • aspirin (aspirin) 81 MG EC tablet Take 81 mg by mouth Daily.     • carvedilol (COREG) 25 MG tablet Take 1 tablet by mouth 2 (Two) Times a Day. 180 tablet 3   • cetirizine (zyrTEC) 10 MG tablet Take 10 mg by mouth Daily.     • cloNIDine (CATAPRES) 0.1 MG tablet Take 1 tablet by mouth 3 (Three) Times a Day As Needed for High Blood Pressure (> 160/90). 30 tablet 1   • diclofenac (CATAFLAM) 50 MG tablet Take 50 mg by mouth 2 (Two) Times a Day As Needed.     • levothyroxine (SYNTHROID, LEVOTHROID) 75 MCG tablet Take 75 mcg by mouth Daily.     • metFORMIN ER (GLUCOPHAGE-XR) 500 MG 24 hr tablet Take 1,000 mg by mouth 2 (Two) Times a Day.     • prenatal vitamin (prenatal, CLASSIC, vitamin) tablet Take  by mouth Daily.       No current facility-administered medications on file prior to visit.       Penicillins    Past Medical History:   Diagnosis Date   • Hypertension    • Hypothyroidism    • Polycystic disease, ovaries     PCOS       Social History     Socioeconomic History   • Marital status:    Tobacco Use   • Smoking  "status: Never Smoker   • Smokeless tobacco: Never Used   Substance and Sexual Activity   • Alcohol use: Yes     Comment: occasional    • Drug use: No   • Sexual activity: Defer       Family History   Problem Relation Age of Onset   • No Known Problems Mother    • Hypertension Father    • Hyperlipidemia Father    • Heart disease Father    • Heart attack Father    • Anemia Father        Review of Systems   Constitutional: Positive for fatigue. Negative for chills and fever.   HENT: Negative for congestion, rhinorrhea and sore throat.    Eyes: Positive for visual disturbance (glasses / contacts ).   Respiratory: Negative.  Negative for chest tightness, shortness of breath and wheezing.    Cardiovascular: Positive for leg swelling. Negative for chest pain and palpitations.   Gastrointestinal: Negative.    Musculoskeletal: Positive for back pain. Negative for arthralgias and neck pain.   Skin: Negative.  Negative for rash and wound.   Allergic/Immunologic: Positive for environmental allergies.   Neurological: Positive for headaches. Negative for dizziness, weakness and numbness.   Hematological: Negative.  Does not bruise/bleed easily.   Psychiatric/Behavioral: Negative.  Negative for sleep disturbance.       Objective   Vitals:    10/25/21 1333   BP: 159/92   BP Location: Left arm   Patient Position: Sitting   Pulse: 74   SpO2: 98%   Weight: 133 kg (293 lb 6.4 oz)   Height: 175.3 cm (69.02\")      /92 (BP Location: Left arm, Patient Position: Sitting)   Pulse 74   Ht 175.3 cm (69.02\")   Wt 133 kg (293 lb 6.4 oz)   SpO2 98%   BMI 43.30 kg/m²    Lab Results (most recent)     None        Physical Exam  Vitals and nursing note reviewed.   Constitutional:       General: She is not in acute distress.     Appearance: She is well-developed.   HENT:      Head: Normocephalic and atraumatic.   Eyes:      Conjunctiva/sclera: Conjunctivae normal.      Pupils: Pupils are equal, round, and reactive to light.   Neck:      " Vascular: No JVD.      Trachea: No tracheal deviation.   Cardiovascular:      Rate and Rhythm: Normal rate and regular rhythm.      Heart sounds: Normal heart sounds.   Pulmonary:      Effort: Pulmonary effort is normal.      Breath sounds: Normal breath sounds.   Abdominal:      General: Bowel sounds are normal. There is no distension.      Palpations: Abdomen is soft. There is no mass.      Tenderness: There is no abdominal tenderness. There is no guarding or rebound.   Musculoskeletal:         General: No tenderness or deformity. Normal range of motion.      Cervical back: Normal range of motion and neck supple.   Skin:     General: Skin is warm and dry.      Coloration: Skin is not pale.      Findings: No erythema or rash.   Neurological:      Mental Status: She is alert and oriented to person, place, and time.   Psychiatric:         Behavior: Behavior normal.         Thought Content: Thought content normal.         Judgment: Judgment normal.           Procedure     ECG 12 Lead    Date/Time: 10/25/2021 1:36 PM  Performed by: Good [place] PA  Authorized by: Good Green PA   Comparison: compared with previous ECG from 11/30/2020  Comparison to previous ECG: Sinus rhythm, rate 60, normal axis, no acute changes noted.                 Assessment/Plan      Diagnosis Plan   1. Essential hypertension     2. Dyspnea on exertion     3. Other fatigue       1.  At this time, the patient remains hypertensive.  I would like to restart hydrochlorothiazide.  She will continue antihypertensive medications otherwise.  She will monitor blood pressures closely at home and call us for any issues with the same.    2.  Symptomatically, the patient has stable dyspnea and fatigue.  She really has no further cardiovascular issues or symptoms otherwise.  I do not feel anything further is indicated from cardiovascular standpoint.    3.  We will see the patient on 6-month intervals at this time.  She will call for any issues  prior to follow-up.                   Electronically signed by:

## 2022-01-24 RX ORDER — AMLODIPINE BESYLATE 10 MG/1
TABLET ORAL
Qty: 90 TABLET | Refills: 0 | Status: SHIPPED | OUTPATIENT
Start: 2022-01-24 | End: 2022-04-25 | Stop reason: SDUPTHER

## 2022-03-01 ENCOUNTER — LAB (OUTPATIENT)
Dept: FAMILY MEDICINE CLINIC | Facility: CLINIC | Age: 37
End: 2022-03-01

## 2022-03-01 DIAGNOSIS — I10 ESSENTIAL HYPERTENSION, MALIGNANT: ICD-10-CM

## 2022-03-01 DIAGNOSIS — E03.9 MYXEDEMA HEART DISEASE: ICD-10-CM

## 2022-03-01 DIAGNOSIS — Z13.1 SCREENING FOR DIABETES MELLITUS: Primary | ICD-10-CM

## 2022-03-01 DIAGNOSIS — I51.9 MYXEDEMA HEART DISEASE: ICD-10-CM

## 2022-03-01 DIAGNOSIS — E66.01 MORBID OBESITY: ICD-10-CM

## 2022-03-01 DIAGNOSIS — Z13.220 SCREENING FOR LIPOID DISORDERS: ICD-10-CM

## 2022-03-01 PROCEDURE — 82306 VITAMIN D 25 HYDROXY: CPT | Performed by: NURSE PRACTITIONER

## 2022-03-01 PROCEDURE — 80061 LIPID PANEL: CPT | Performed by: NURSE PRACTITIONER

## 2022-03-01 PROCEDURE — 83036 HEMOGLOBIN GLYCOSYLATED A1C: CPT | Performed by: NURSE PRACTITIONER

## 2022-03-01 PROCEDURE — 80050 GENERAL HEALTH PANEL: CPT | Performed by: NURSE PRACTITIONER

## 2022-03-02 LAB
25(OH)D3 SERPL-MCNC: 24.1 NG/ML
ALBUMIN SERPL-MCNC: 4 G/DL (ref 3.5–5.2)
ALBUMIN/GLOB SERPL: 1.6 G/DL
ALP SERPL-CCNC: 78 U/L (ref 39–117)
ALT SERPL W P-5'-P-CCNC: 17 U/L (ref 1–33)
ANION GAP SERPL CALCULATED.3IONS-SCNC: 12.6 MMOL/L (ref 5–15)
AST SERPL-CCNC: 14 U/L (ref 1–32)
BASOPHILS # BLD AUTO: 0.05 10*3/MM3 (ref 0–0.2)
BASOPHILS NFR BLD AUTO: 0.9 % (ref 0–1.5)
BILIRUB SERPL-MCNC: 0.5 MG/DL (ref 0–1.2)
BUN SERPL-MCNC: 10 MG/DL (ref 6–20)
BUN/CREAT SERPL: 15.4 (ref 7–25)
CALCIUM SPEC-SCNC: 9.1 MG/DL (ref 8.6–10.5)
CHLORIDE SERPL-SCNC: 104 MMOL/L (ref 98–107)
CHOLEST SERPL-MCNC: 197 MG/DL (ref 0–200)
CO2 SERPL-SCNC: 23.4 MMOL/L (ref 22–29)
CREAT SERPL-MCNC: 0.65 MG/DL (ref 0.57–1)
DEPRECATED RDW RBC AUTO: 40.1 FL (ref 37–54)
EGFRCR SERPLBLD CKD-EPI 2021: 118.7 ML/MIN/1.73
EOSINOPHIL # BLD AUTO: 0.18 10*3/MM3 (ref 0–0.4)
EOSINOPHIL NFR BLD AUTO: 3.2 % (ref 0.3–6.2)
ERYTHROCYTE [DISTWIDTH] IN BLOOD BY AUTOMATED COUNT: 14.3 % (ref 12.3–15.4)
GLOBULIN UR ELPH-MCNC: 2.5 GM/DL
GLUCOSE SERPL-MCNC: 105 MG/DL (ref 65–99)
HBA1C MFR BLD: 5.5 % (ref 4.8–5.6)
HCT VFR BLD AUTO: 42.5 % (ref 34–46.6)
HDLC SERPL-MCNC: 46 MG/DL (ref 40–60)
HGB BLD-MCNC: 14 G/DL (ref 12–15.9)
IMM GRANULOCYTES # BLD AUTO: 0.02 10*3/MM3 (ref 0–0.05)
IMM GRANULOCYTES NFR BLD AUTO: 0.4 % (ref 0–0.5)
LDLC SERPL CALC-MCNC: 119 MG/DL (ref 0–100)
LDLC/HDLC SERPL: 2.5 {RATIO}
LYMPHOCYTES # BLD AUTO: 2.3 10*3/MM3 (ref 0.7–3.1)
LYMPHOCYTES NFR BLD AUTO: 41.4 % (ref 19.6–45.3)
MCH RBC QN AUTO: 26 PG (ref 26.6–33)
MCHC RBC AUTO-ENTMCNC: 32.9 G/DL (ref 31.5–35.7)
MCV RBC AUTO: 79 FL (ref 79–97)
MONOCYTES # BLD AUTO: 0.5 10*3/MM3 (ref 0.1–0.9)
MONOCYTES NFR BLD AUTO: 9 % (ref 5–12)
NEUTROPHILS NFR BLD AUTO: 2.5 10*3/MM3 (ref 1.7–7)
NEUTROPHILS NFR BLD AUTO: 45.1 % (ref 42.7–76)
NRBC BLD AUTO-RTO: 0 /100 WBC (ref 0–0.2)
PLATELET # BLD AUTO: 314 10*3/MM3 (ref 140–450)
PMV BLD AUTO: 10.7 FL (ref 6–12)
POTASSIUM SERPL-SCNC: 4.5 MMOL/L (ref 3.5–5.2)
PROT SERPL-MCNC: 6.5 G/DL (ref 6–8.5)
RBC # BLD AUTO: 5.38 10*6/MM3 (ref 3.77–5.28)
SODIUM SERPL-SCNC: 140 MMOL/L (ref 136–145)
TRIGL SERPL-MCNC: 181 MG/DL (ref 0–150)
TSH SERPL DL<=0.05 MIU/L-ACNC: 2.73 UIU/ML (ref 0.27–4.2)
VLDLC SERPL-MCNC: 32 MG/DL (ref 5–40)
WBC NRBC COR # BLD: 5.55 10*3/MM3 (ref 3.4–10.8)

## 2022-04-11 ENCOUNTER — TRANSCRIBE ORDERS (OUTPATIENT)
Dept: ADMINISTRATIVE | Facility: HOSPITAL | Age: 37
End: 2022-04-11

## 2022-04-11 DIAGNOSIS — M25.571 ARTHRALGIA OF RIGHT ANKLE: Primary | ICD-10-CM

## 2022-04-25 ENCOUNTER — OFFICE VISIT (OUTPATIENT)
Dept: CARDIOLOGY | Facility: CLINIC | Age: 37
End: 2022-04-25

## 2022-04-25 VITALS
OXYGEN SATURATION: 98 % | BODY MASS INDEX: 43.4 KG/M2 | HEIGHT: 69 IN | WEIGHT: 293 LBS | SYSTOLIC BLOOD PRESSURE: 131 MMHG | DIASTOLIC BLOOD PRESSURE: 89 MMHG | HEART RATE: 82 BPM

## 2022-04-25 DIAGNOSIS — R53.83 OTHER FATIGUE: ICD-10-CM

## 2022-04-25 DIAGNOSIS — R06.09 DYSPNEA ON EXERTION: Primary | ICD-10-CM

## 2022-04-25 DIAGNOSIS — I10 ESSENTIAL HYPERTENSION: ICD-10-CM

## 2022-04-25 PROCEDURE — 99213 OFFICE O/P EST LOW 20 MIN: CPT | Performed by: PHYSICIAN ASSISTANT

## 2022-04-25 RX ORDER — HYDROCHLOROTHIAZIDE 25 MG/1
25 TABLET ORAL DAILY
Qty: 90 TABLET | Refills: 3 | Status: SHIPPED | OUTPATIENT
Start: 2022-04-25 | End: 2022-10-18 | Stop reason: SDUPTHER

## 2022-04-25 RX ORDER — AMLODIPINE BESYLATE 10 MG/1
10 TABLET ORAL DAILY
Qty: 90 TABLET | Refills: 3 | Status: SHIPPED | OUTPATIENT
Start: 2022-04-25 | End: 2022-10-18 | Stop reason: SDUPTHER

## 2022-04-25 RX ORDER — MULTIPLE VITAMINS W/ MINERALS TAB 9MG-400MCG
1 TAB ORAL DAILY
COMMUNITY

## 2022-04-25 RX ORDER — CARVEDILOL 25 MG/1
25 TABLET ORAL 2 TIMES DAILY
Qty: 180 TABLET | Refills: 3 | Status: SHIPPED | OUTPATIENT
Start: 2022-04-25 | End: 2022-10-18 | Stop reason: SDUPTHER

## 2022-04-25 NOTE — PROGRESS NOTES
Problem list     Subjective   Janeth Moctezuma is a 36 y.o. female     Chief Complaint   Patient presents with   • Follow-up     6 month    • Hypertension       HPI  The patient presents back to the clinic today for follow-up.  We had seen her historically in the setting of hypertension and symptoms otherwise.  During pregnancy, the majority of her medications were adjusted because of the same.  She is now back on amlodipine, carvedilol, and was recently added on hydrochlorothiazide.  We wanted to see her back today just to evaluate average blood pressures and ensure ongoing stability.  Since starting hydrochlorothiazide, blood pressures have mostly normalized.  She has stopped checking blood pressures as routine recently and we have asked her to restart that.  She has no chest pain.  She has stable dyspnea and fatigue.  She has no failure nor dysrhythmic symptoms.  Overall, she does feel improved with hydrochlorothiazide.  She has no further complaints at this time.    Current Outpatient Medications on File Prior to Visit   Medication Sig Dispense Refill   • aspirin 81 MG EC tablet Take 81 mg by mouth Daily.     • cetirizine (zyrTEC) 10 MG tablet Take 10 mg by mouth Daily.     • cloNIDine (CATAPRES) 0.1 MG tablet Take 1 tablet by mouth 3 (Three) Times a Day As Needed for High Blood Pressure (> 160/90). 30 tablet 1   • diclofenac (CATAFLAM) 50 MG tablet Take 50 mg by mouth 2 (Two) Times a Day As Needed.     • levothyroxine (SYNTHROID, LEVOTHROID) 75 MCG tablet Take 75 mcg by mouth Daily.     • metFORMIN ER (GLUCOPHAGE-XR) 500 MG 24 hr tablet Take 1,000 mg by mouth 2 (Two) Times a Day.     • multivitamin with minerals (MULTIVITAMIN WOMEN PO) Take 1 tablet by mouth Daily.     • [DISCONTINUED] amLODIPine (NORVASC) 10 MG tablet Take 1 tablet by mouth once daily 90 tablet 0   • [DISCONTINUED] carvedilol (COREG) 25 MG tablet Take 1 tablet by mouth 2 (Two) Times a Day. 180 tablet 3   • [DISCONTINUED] hydroCHLOROthiazide  (HYDRODIURIL) 25 MG tablet Take 1 tablet by mouth Daily. 30 tablet 11   • [DISCONTINUED] prenatal vitamin (prenatal, CLASSIC, vitamin) tablet Take  by mouth Daily.       No current facility-administered medications on file prior to visit.       Penicillins    Past Medical History:   Diagnosis Date   • Hypertension    • Hypothyroidism    • Polycystic disease, ovaries     PCOS       Social History     Socioeconomic History   • Marital status:    Tobacco Use   • Smoking status: Never Smoker   • Smokeless tobacco: Never Used   Vaping Use   • Vaping Use: Never used   Substance and Sexual Activity   • Alcohol use: Yes     Comment: occasional    • Drug use: No   • Sexual activity: Yes     Partners: Male     Birth control/protection: Pill       Family History   Problem Relation Age of Onset   • No Known Problems Mother    • Hypertension Father    • Hyperlipidemia Father    • Heart disease Father    • Heart attack Father    • Anemia Father    • JUNAID disease Brother        Review of Systems   Constitutional: Negative.  Negative for chills, fatigue and fever.   HENT: Negative for congestion, rhinorrhea and sore throat.    Eyes: Positive for visual disturbance (contatcs/ glasses).   Respiratory: Negative.  Negative for chest tightness, shortness of breath and wheezing.    Cardiovascular: Positive for leg swelling. Negative for chest pain and palpitations.   Gastrointestinal: Negative.    Endocrine: Negative.    Genitourinary: Negative.    Musculoskeletal: Positive for back pain. Negative for arthralgias and neck pain.   Skin: Negative.  Negative for rash and wound.   Allergic/Immunologic: Positive for environmental allergies.   Neurological: Positive for numbness (left leg) and headaches. Negative for dizziness and weakness.   Hematological: Negative.  Does not bruise/bleed easily.   Psychiatric/Behavioral: Negative.  Negative for sleep disturbance.       Objective   Vitals:    04/25/22 1406   BP: 131/89   BP Location:  "Left arm   Patient Position: Sitting   Pulse: 82   SpO2: 98%   Weight: (!) 138 kg (303 lb 9.6 oz)   Height: 175.3 cm (69.02\")      /89 (BP Location: Left arm, Patient Position: Sitting)   Pulse 82   Ht 175.3 cm (69.02\")   Wt (!) 138 kg (303 lb 9.6 oz)   SpO2 98%   BMI 44.81 kg/m²    Lab Results (most recent)     None        Physical Exam  Vitals and nursing note reviewed.   Constitutional:       General: She is not in acute distress.     Appearance: She is well-developed.   HENT:      Head: Normocephalic and atraumatic.   Eyes:      Conjunctiva/sclera: Conjunctivae normal.      Pupils: Pupils are equal, round, and reactive to light.   Neck:      Vascular: No JVD.      Trachea: No tracheal deviation.   Cardiovascular:      Rate and Rhythm: Normal rate and regular rhythm.      Heart sounds: Normal heart sounds.   Pulmonary:      Effort: Pulmonary effort is normal.      Breath sounds: Normal breath sounds.   Abdominal:      General: Bowel sounds are normal. There is no distension.      Palpations: Abdomen is soft. There is no mass.      Tenderness: There is no abdominal tenderness. There is no guarding or rebound.   Musculoskeletal:         General: No tenderness or deformity. Normal range of motion.      Cervical back: Normal range of motion and neck supple.   Skin:     General: Skin is warm and dry.      Coloration: Skin is not pale.      Findings: No erythema or rash.   Neurological:      Mental Status: She is alert and oriented to person, place, and time.   Psychiatric:         Behavior: Behavior normal.         Thought Content: Thought content normal.         Judgment: Judgment normal.           Procedure   Procedures       Assessment/Plan      Diagnosis Plan   1. Dyspnea on exertion     2. Essential hypertension  carvedilol (COREG) 25 MG tablet   3. Other fatigue       1.  At this time, the patient appears to be doing fairly well from general cardiovascular standpoint.  Since starting " hydrochlorothiazide in conjunction with amlodipine and carvedilol, blood pressures have mostly normalized.  I will continue that without change.  We did give her refills of antihypertensive medications.  She will monitor blood pressures closely and call to us for any issues.    2.  Dyspnea and fatigue are at baseline.  She has no failure, dysrhythmic, or anginal equivalent symptoms otherwise.  She will call back immediately for any issues.    3.  Nothing further for now and we will continue to see her on 6-month intervals.                      Electronically signed by:

## 2022-05-02 ENCOUNTER — APPOINTMENT (OUTPATIENT)
Dept: MRI IMAGING | Facility: HOSPITAL | Age: 37
End: 2022-05-02

## 2022-06-06 ENCOUNTER — HOSPITAL ENCOUNTER (OUTPATIENT)
Dept: MRI IMAGING | Facility: HOSPITAL | Age: 37
Discharge: HOME | End: 2022-06-06
Admitting: NURSE PRACTITIONER

## 2022-06-06 DIAGNOSIS — M25.571 ARTHRALGIA OF RIGHT ANKLE: ICD-10-CM

## 2022-06-06 PROCEDURE — 73721 MRI JNT OF LWR EXTRE W/O DYE: CPT

## 2022-06-06 PROCEDURE — 73721 MRI JNT OF LWR EXTRE W/O DYE: CPT | Performed by: RADIOLOGY

## 2022-10-18 ENCOUNTER — OFFICE VISIT (OUTPATIENT)
Dept: FAMILY MEDICINE CLINIC | Facility: CLINIC | Age: 37
End: 2022-10-18

## 2022-10-18 VITALS
TEMPERATURE: 97.1 F | DIASTOLIC BLOOD PRESSURE: 70 MMHG | HEART RATE: 78 BPM | BODY MASS INDEX: 43.4 KG/M2 | HEIGHT: 69 IN | SYSTOLIC BLOOD PRESSURE: 118 MMHG | WEIGHT: 293 LBS | OXYGEN SATURATION: 97 %

## 2022-10-18 DIAGNOSIS — J06.9 VIRAL URI: ICD-10-CM

## 2022-10-18 DIAGNOSIS — H66.003 NON-RECURRENT ACUTE SUPPURATIVE OTITIS MEDIA OF BOTH EARS WITHOUT SPONTANEOUS RUPTURE OF TYMPANIC MEMBRANES: Primary | ICD-10-CM

## 2022-10-18 DIAGNOSIS — E66.01 MORBID OBESITY: ICD-10-CM

## 2022-10-18 DIAGNOSIS — I10 ESSENTIAL HYPERTENSION: ICD-10-CM

## 2022-10-18 PROCEDURE — 99214 OFFICE O/P EST MOD 30 MIN: CPT | Performed by: NURSE PRACTITIONER

## 2022-10-18 RX ORDER — DOXYCYCLINE HYCLATE 100 MG/1
100 CAPSULE ORAL 2 TIMES DAILY
Qty: 20 CAPSULE | Refills: 0 | Status: SHIPPED | OUTPATIENT
Start: 2022-10-18 | End: 2022-10-28

## 2022-10-18 RX ORDER — LEVOCETIRIZINE DIHYDROCHLORIDE 5 MG/1
5 TABLET, FILM COATED ORAL EVERY EVENING
COMMUNITY
End: 2023-02-21

## 2022-10-18 RX ORDER — BROMPHENIRAMINE MALEATE, PSEUDOEPHEDRINE HYDROCHLORIDE, AND DEXTROMETHORPHAN HYDROBROMIDE 2; 30; 10 MG/5ML; MG/5ML; MG/5ML
5 SYRUP ORAL 4 TIMES DAILY PRN
Qty: 200 ML | Refills: 0 | Status: SHIPPED | OUTPATIENT
Start: 2022-10-18 | End: 2022-12-06

## 2022-10-18 NOTE — PROGRESS NOTES
Chief Complaint  Cough and Earache    Subjective          Janeth Moctezuma is a 36 y.o. female who presents today to [facility] for initial evaluation     HPI:   History of Present Illness    Presents to clinic for complaint of sore throat starting 5 days ago. Associated symptoms: Sinuses, ear pain. Treatments: Cetirizine, Xyzal, cough drops. Had negative strep test 5 days ago. No other issues or concerns at this time.    The following portions of the patient's history were reviewed and updated as appropriate: allergies, current medications, past family history, past medical history, past social history, past surgical history and problem list.    Objective     Allergy:   Allergies   Allergen Reactions   • Penicillins Rash        Current Medications:   Current Outpatient Medications   Medication Sig Dispense Refill   • amLODIPine (NORVASC) 10 MG tablet Take 1 tablet by mouth Daily. 90 tablet 2   • aspirin 81 MG EC tablet Take 81 mg by mouth Daily.     • carvedilol (COREG) 25 MG tablet Take 1 tablet by mouth 2 (Two) Times a Day. 180 tablet 2   • cetirizine (zyrTEC) 10 MG tablet Take 10 mg by mouth Daily.     • cloNIDine (CATAPRES) 0.1 MG tablet Take 1 tablet by mouth 3 (Three) Times a Day As Needed for High Blood Pressure (> 160/90). 30 tablet 1   • diclofenac (CATAFLAM) 50 MG tablet Take 50 mg by mouth 2 (Two) Times a Day As Needed.     • hydroCHLOROthiazide (HYDRODIURIL) 25 MG tablet Take 1 tablet by mouth Daily. 90 tablet 2   • levocetirizine (XYZAL) 5 MG tablet Take 1 tablet by mouth Every Evening.     • levothyroxine (SYNTHROID, LEVOTHROID) 75 MCG tablet Take 1 tablet by mouth every morning on a empty stomach 30 tablet 0   • metFORMIN ER (GLUCOPHAGE-XR) 500 MG 24 hr tablet Take 2 tablets by mouth every morning and 2 tablets every evening. 120 tablet 0   • multivitamin with minerals tablet tablet Take 1 tablet by mouth Daily.     • norgestimate-ethinyl estradiol (ORTHO  "TRI-CYCLEN,TRINESSA) 0.18/0.215/0.25 MG-35 MCG per tablet Take 1 tablet by mouth Daily. 28 tablet 7   • brompheniramine-pseudoephedrine-DM 30-2-10 MG/5ML syrup Take 5 mL by mouth 4 (Four) Times a Day As Needed for Congestion, Cough or Allergies. 200 mL 0   • doxycycline (VIBRAMYCIN) 100 MG capsule Take 1 capsule by mouth 2 (Two) Times a Day for 10 days. 20 capsule 0     No current facility-administered medications for this visit.       Past Medical History:  Past Medical History:   Diagnosis Date   • Hypertension    • Hypothyroidism    • Polycystic disease, ovaries     PCOS       Past Surgical History:  Past Surgical History:   Procedure Laterality Date   •  SECTION     • FACIAL RECONSTRUCTION SURGERY  2017   • KNEE SURGERY         Social History:  Social History     Socioeconomic History   • Marital status:    Tobacco Use   • Smoking status: Never   • Smokeless tobacco: Never   Vaping Use   • Vaping Use: Never used   Substance and Sexual Activity   • Alcohol use: Yes     Comment: occasional    • Drug use: No   • Sexual activity: Yes     Partners: Male     Birth control/protection: Pill       Family History:  Family History   Problem Relation Age of Onset   • No Known Problems Mother    • Hypertension Father    • Hyperlipidemia Father    • Heart disease Father    • Heart attack Father    • Anemia Father    • JUNAID disease Brother        Review of Systems:  Review of Systems   Constitutional: Negative for chills and fever.   Respiratory: Negative for shortness of breath and wheezing.    Gastrointestinal: Negative for nausea and vomiting.       Vital Signs:   /70   Pulse 78   Temp 97.1 °F (36.2 °C)   Ht 175.3 cm (69\")   Wt (!) 137 kg (303 lb)   SpO2 97%   BMI 44.75 kg/m²  RR: 17    Physical Exam:  Physical Exam  Vitals and nursing note reviewed.   Constitutional:       General: She is not in acute distress.     Appearance: Normal appearance. She is not ill-appearing or toxic-appearing. "   HENT:      Head: Normocephalic.      Right Ear: Ear canal and external ear normal. Tenderness present. Tympanic membrane is erythematous and bulging.      Left Ear: Ear canal and external ear normal. Tenderness present. Tympanic membrane is erythematous and bulging.      Nose: Congestion and rhinorrhea present.   Eyes:      Conjunctiva/sclera: Conjunctivae normal.   Cardiovascular:      Rate and Rhythm: Normal rate and regular rhythm.      Heart sounds: Normal heart sounds.   Pulmonary:      Effort: Pulmonary effort is normal. No respiratory distress.      Breath sounds: Normal breath sounds.   Lymphadenopathy:      Cervical: No cervical adenopathy.   Skin:     General: Skin is warm and dry.      Coloration: Skin is not pale.   Neurological:      Mental Status: She is alert and oriented to person, place, and time.   Psychiatric:         Mood and Affect: Mood normal.         Behavior: Behavior normal.         Thought Content: Thought content normal.         Judgment: Judgment normal.                Assessment and Plan   Diagnoses and all orders for this visit:    1. Non-recurrent acute suppurative otitis media of both ears without spontaneous rupture of tympanic membranes (Primary)  -     doxycycline (VIBRAMYCIN) 100 MG capsule; Take 1 capsule by mouth 2 (Two) Times a Day for 10 days.  Dispense: 20 capsule; Refill: 0    2. Viral URI  -     brompheniramine-pseudoephedrine-DM 30-2-10 MG/5ML syrup; Take 5 mL by mouth 4 (Four) Times a Day As Needed for Congestion, Cough or Allergies.  Dispense: 200 mL; Refill: 0    3. Morbid obesity (HCC)    4. Essential hypertension    1.  Regimen as above  2.  Cough and deep breathe.  Increase humidification and hydration.  3.  Diet and lifestyle modifications to promote weight loss.  4.  Monitor blood pressure while taking Bromfed as that can elevate your blood pressure.  Discontinue medication if blood pressure becomes elevated above established parameters.    Declines any testing.  Discussed possible differential diagnoses, testing, treatment, recommended non-pharmacological interventions, risks, warning signs to monitor for that would indicate need for follow-up in clinic or ER. If no improvement with these regimens or you have new or worsening symptoms follow-up. Patient verbalizes understanding and agreement with plan of care. Denies further needs or concerns.     Patient was given instructions and counseling regarding her condition and for health maintenance advised.    Class 3 Severe Obesity (BMI >=40). Obesity-related health conditions include the following: obstructive sleep apnea, hypertension, coronary heart disease, diabetes mellitus, dyslipidemias, GERD and peripheral vascular disease. Obesity is unchanged. BMI is is above average; BMI management plan is completed. We discussed portion control and increasing exercise.       I spent 30 minutes caring for patient on this date of service. This time includes time spent by me in the following activities: preparing for the visit, reviewing tests, obtaining and/or reviewing a separately obtained history, performing a medically appropriate examination and/or evaluation, counseling and educating the patient/family/caregiver, ordering medications, tests, or procedures and documenting information in the medical record    Meds ordered during this visit:  New Medications Ordered This Visit   Medications   • doxycycline (VIBRAMYCIN) 100 MG capsule     Sig: Take 1 capsule by mouth 2 (Two) Times a Day for 10 days.     Dispense:  20 capsule     Refill:  0   • brompheniramine-pseudoephedrine-DM 30-2-10 MG/5ML syrup     Sig: Take 5 mL by mouth 4 (Four) Times a Day As Needed for Congestion, Cough or Allergies.     Dispense:  200 mL     Refill:  0       Patient Instructions:  Patient instructions given for the following visit diagnosis:    ICD-10-CM ICD-9-CM   1. Non-recurrent acute suppurative otitis media of both ears without spontaneous rupture of  tympanic membranes  H66.003 382.00   2. Viral URI  J06.9 465.9   3. Morbid obesity (HCC)  E66.01 278.01   4. Essential hypertension  I10 401.9       Follow Up   Return if symptoms worsen or fail to improve.        This document has been electronically signed by GERDA Soriano  October 18, 2022 12:58 EDT    Patient was given instructions and counseling regarding her condition or for health maintenance advice. Please see specific information pulled into the AVS if appropriate.     Part of this note may be an electronic transcription/translation of spoken language to printed text using the Dragon Dictation System.

## 2022-11-07 ENCOUNTER — OFFICE VISIT (OUTPATIENT)
Dept: CARDIOLOGY | Facility: CLINIC | Age: 37
End: 2022-11-07

## 2022-11-07 VITALS
SYSTOLIC BLOOD PRESSURE: 147 MMHG | HEART RATE: 76 BPM | OXYGEN SATURATION: 97 % | HEIGHT: 69 IN | WEIGHT: 293 LBS | BODY MASS INDEX: 43.4 KG/M2 | DIASTOLIC BLOOD PRESSURE: 95 MMHG

## 2022-11-07 DIAGNOSIS — R53.83 OTHER FATIGUE: ICD-10-CM

## 2022-11-07 DIAGNOSIS — I10 ESSENTIAL HYPERTENSION: ICD-10-CM

## 2022-11-07 DIAGNOSIS — R06.09 DYSPNEA ON EXERTION: Primary | ICD-10-CM

## 2022-11-07 PROCEDURE — 93000 ELECTROCARDIOGRAM COMPLETE: CPT | Performed by: PHYSICIAN ASSISTANT

## 2022-11-07 PROCEDURE — 99213 OFFICE O/P EST LOW 20 MIN: CPT | Performed by: PHYSICIAN ASSISTANT

## 2022-11-07 NOTE — PROGRESS NOTES
Subjective   Janeth Moctezuma is a 37 y.o. female     Chief Complaint   Patient presents with   • Hypertension     6 months       HPI  The patient presents back to the clinic today for routine evaluation and follow-up.  For the most part, the patient has done well since last evaluation.  We have seen this patient historically because of hypertension and symptoms otherwise.  Prior work-up has been largely benign.  Her stress and echo approximately 5 or so years ago were benign.  She has done so well that we have not repeated follow-up studies.  Now, the patient reports she still continues to do well.  She reports no continued chest pain.  Dyspnea and fatigue are at baseline.  Despite today's value, blood pressures typically are fairly well controlled.  She is tolerating medications without complication.  She has no further complaints otherwise at this time.      Current Outpatient Medications   Medication Sig Dispense Refill   • amLODIPine (NORVASC) 10 MG tablet Take 1 tablet by mouth Daily. 90 tablet 2   • aspirin 81 MG EC tablet Take 81 mg by mouth Daily.     • carvedilol (COREG) 25 MG tablet Take 1 tablet by mouth 2 (Two) Times a Day. 90 tablet 0   • cetirizine (zyrTEC) 10 MG tablet Take 10 mg by mouth Daily.     • cloNIDine (CATAPRES) 0.1 MG tablet Take 1 tablet by mouth 3 (Three) Times a Day As Needed for High Blood Pressure (> 160/90). 30 tablet 1   • diclofenac (CATAFLAM) 50 MG tablet Take 50 mg by mouth 2 (Two) Times a Day As Needed.     • hydroCHLOROthiazide (HYDRODIURIL) 25 MG tablet Take 1 tablet by mouth Daily. 90 tablet 2   • levocetirizine (XYZAL) 5 MG tablet Take 1 tablet by mouth Every Evening.     • levothyroxine (SYNTHROID, LEVOTHROID) 75 MCG tablet Take 1 tablet by mouth in the morning on an empty stomach 90 tablet 0   • metFORMIN ER (GLUCOPHAGE-XR) 500 MG 24 hr tablet Take 2 tablets by mouth every morning and 2 tablets every evening. 120 tablet 0   • multivitamin with minerals tablet tablet Take 1  tablet by mouth Daily.     • norgestimate-ethinyl estradiol (ORTHO TRI-CYCLEN,TRINESSA) 0.18/0.215/0.25 MG-35 MCG per tablet Take 1 tablet by mouth Daily. 28 tablet 7   • brompheniramine-pseudoephedrine-DM 30-2-10 MG/5ML syrup Take 5 mL by mouth 4 (Four) Times a Day As Needed for Congestion, Cough or Allergies. 200 mL 0   • brompheniramine-pseudoephedrine-DM 30-2-10 MG/5ML syrup Take 5ml by mouth 4 times daily as needed for cough, congestion, or allergies 200 mL 0   • doxycycline (VIBRAMYCIN) 100 MG capsule Take 1 capsule by mouth twice daily for 10 days 20 capsule 0   • levothyroxine (SYNTHROID, LEVOTHROID) 75 MCG tablet Take 1 tablet by mouth every morning on a empty stomach 30 tablet 0   • metFORMIN ER (GLUCOPHAGE-XR) 500 MG 24 hr tablet Take 2 tablets by mouth twice a day 360 tablet 0     No current facility-administered medications for this visit.       Penicillins    Past Medical History:   Diagnosis Date   • Hypertension    • Hypothyroidism    • Polycystic disease, ovaries     PCOS       Social History     Socioeconomic History   • Marital status:    Tobacco Use   • Smoking status: Never   • Smokeless tobacco: Never   Vaping Use   • Vaping Use: Never used   Substance and Sexual Activity   • Alcohol use: Yes     Comment: occasional    • Drug use: No   • Sexual activity: Yes     Partners: Male     Birth control/protection: Pill       Family History   Problem Relation Age of Onset   • No Known Problems Mother    • Hypertension Father    • Hyperlipidemia Father    • Heart disease Father    • Heart attack Father    • Anemia Father    • JUNAID disease Brother        Review of Systems   Constitutional: Negative.  Negative for activity change, appetite change, chills, fatigue and fever.   Eyes: Negative.    Respiratory: Negative for apnea, cough, chest tightness, shortness of breath and wheezing.    Cardiovascular: Positive for leg swelling (LE swelling). Negative for chest pain and palpitations.  "  Gastrointestinal: Negative.  Negative for blood in stool.   Endocrine: Negative.  Negative for cold intolerance and heat intolerance.   Genitourinary: Negative.  Negative for hematuria.   Musculoskeletal: Positive for arthralgias, back pain and joint swelling. Negative for gait problem, neck pain and neck stiffness.   Skin: Negative for color change, rash and wound.   Allergic/Immunologic: Positive for environmental allergies. Negative for food allergies.   Neurological: Positive for light-headedness (Chronic). Negative for dizziness, syncope, weakness, numbness and headaches.   Hematological: Negative.  Does not bruise/bleed easily.   Psychiatric/Behavioral: Positive for sleep disturbance.       Objective     Vitals:    11/07/22 1542   BP: 147/95   BP Location: Left arm   Patient Position: Sitting   Cuff Size: Adult   Pulse: 76   SpO2: 97%   Weight: (!) 137 kg (301 lb)   Height: 175.3 cm (69\")        /95 (BP Location: Left arm, Patient Position: Sitting, Cuff Size: Adult)   Pulse 76   Ht 175.3 cm (69\")   Wt (!) 137 kg (301 lb)   SpO2 97%   BMI 44.45 kg/m²      Lab Results (most recent)     None          Physical Exam  Vitals and nursing note reviewed.   Constitutional:       General: She is not in acute distress.     Appearance: She is well-developed.   HENT:      Head: Normocephalic and atraumatic.   Eyes:      Conjunctiva/sclera: Conjunctivae normal.      Pupils: Pupils are equal, round, and reactive to light.   Neck:      Vascular: No JVD.      Trachea: No tracheal deviation.   Cardiovascular:      Rate and Rhythm: Normal rate and regular rhythm.      Heart sounds: Normal heart sounds.   Pulmonary:      Effort: Pulmonary effort is normal.      Breath sounds: Normal breath sounds.   Abdominal:      General: Bowel sounds are normal. There is no distension.      Palpations: Abdomen is soft. There is no mass.      Tenderness: There is no abdominal tenderness. There is no guarding or rebound. "   Musculoskeletal:         General: No tenderness or deformity. Normal range of motion.      Cervical back: Normal range of motion and neck supple.   Skin:     General: Skin is warm and dry.      Coloration: Skin is not pale.      Findings: No erythema or rash.   Neurological:      Mental Status: She is alert and oriented to person, place, and time.   Psychiatric:         Behavior: Behavior normal.         Thought Content: Thought content normal.         Judgment: Judgment normal.         Procedure     ECG 12 Lead    Date/Time: 11/7/2022 3:49 PM  Performed by: Good [place] PA  Authorized by: Good Green PA   Comparison: compared with previous ECG from 10/25/2021  Comparison to previous ECG: Sinus rhythm, rate 67, normal axis, RSR prime V1 V2, no acute changes noted.                   Assessment & Plan      Diagnosis Plan   1. Dyspnea on exertion        2. Other fatigue        3. Essential hypertension          1.  At this time, the patient appears to be doing well.  Dyspnea and fatigue remain at baseline.  She really has had no further cardiovascular symptoms of any significance.    2.  Despite today's values, blood pressures are typically well controlled at home.  She will monitor that and call for any ongoing issues.    3.  We will make no adjustments in medications.    4.  We will continue to see her on 6 to 12-month intervals.  She will call for any complications prior to follow-up.           Patient did not bring med list or medicine bottles to appointment, med list has been reviewed and updated based on patient's knowledge of their meds.       Electronically signed by:

## 2022-11-16 RX ORDER — VALACYCLOVIR HYDROCHLORIDE 1 G/1
2000 TABLET, FILM COATED ORAL 2 TIMES DAILY
Qty: 4 TABLET | Refills: 0 | Status: SHIPPED | OUTPATIENT
Start: 2022-11-16 | End: 2022-11-17

## 2022-12-06 RX ORDER — FLUTICASONE PROPIONATE 50 MCG
2 SPRAY, SUSPENSION (ML) NASAL DAILY
Qty: 16 ML | Refills: 5 | Status: SHIPPED | OUTPATIENT
Start: 2022-12-06

## 2022-12-06 RX ORDER — PSEUDOEPHEDRINE HCL 120 MG/1
120 TABLET, FILM COATED, EXTENDED RELEASE ORAL EVERY 12 HOURS PRN
Qty: 20 TABLET | Refills: 0 | Status: SHIPPED | OUTPATIENT
Start: 2022-12-06 | End: 2022-12-16

## 2022-12-06 RX ORDER — CETIRIZINE HYDROCHLORIDE 10 MG/1
10 TABLET ORAL DAILY
Qty: 90 TABLET | Refills: 3 | Status: SHIPPED | OUTPATIENT
Start: 2022-12-06

## 2022-12-06 RX ORDER — AZITHROMYCIN 250 MG/1
TABLET, FILM COATED ORAL
Qty: 6 TABLET | Refills: 0 | Status: SHIPPED | OUTPATIENT
Start: 2022-12-06 | End: 2023-02-21

## 2022-12-06 NOTE — H&P
S: Patient with complaint of bilateral ear pain.  Denies any treatments.    O: TMs intact but red and bulging, ears tender.   A: Bilateral otitis media  P: Azithromycin, cetirizine, Flonase

## 2022-12-16 DIAGNOSIS — H66.003 NON-RECURRENT ACUTE SUPPURATIVE OTITIS MEDIA OF BOTH EARS WITHOUT SPONTANEOUS RUPTURE OF TYMPANIC MEMBRANES: Primary | ICD-10-CM

## 2022-12-16 RX ORDER — DOXYCYCLINE HYCLATE 100 MG/1
100 CAPSULE ORAL 2 TIMES DAILY
Qty: 20 CAPSULE | Refills: 0 | Status: SHIPPED | OUTPATIENT
Start: 2022-12-16 | End: 2022-12-26

## 2022-12-16 NOTE — H&P
S: Presents with c/o continued ear pain. Admits she only took 3 days of azithromycin.   O: bilateral TM's Erythematous and bulging, tenderness to bilateral ears  A: Bilateral otitis media  P: RX for doxycycin. Take abx as directed.     F/u in 3-5 days to recheck, sooner if needed

## 2022-12-20 DIAGNOSIS — J06.9 VIRAL URI: ICD-10-CM

## 2022-12-20 DIAGNOSIS — H66.003 NON-RECURRENT ACUTE SUPPURATIVE OTITIS MEDIA OF BOTH EARS WITHOUT SPONTANEOUS RUPTURE OF TYMPANIC MEMBRANES: Primary | ICD-10-CM

## 2022-12-20 RX ORDER — METHYLPREDNISOLONE SODIUM SUCCINATE 40 MG/ML
40 INJECTION, POWDER, LYOPHILIZED, FOR SOLUTION INTRAMUSCULAR; INTRAVENOUS ONCE
Status: SHIPPED | OUTPATIENT
Start: 2022-12-20

## 2022-12-20 NOTE — H&P
S: Patient presents with continued complaint of ear pain and pressure. Currently taking doxy for bilateral AOM. Does report some improvement of symptoms. No other issues or concerns at this time.   O: bilateral TM's Erythematous and bulging, tenderness to bilateral ears  A: bilateral AOM   P: Continue antibiotics. Steroid injection today. F/u for recheck in 2-3 days, sooner if needed.

## 2023-02-21 ENCOUNTER — OFFICE VISIT (OUTPATIENT)
Dept: FAMILY MEDICINE CLINIC | Facility: CLINIC | Age: 38
End: 2023-02-21
Payer: COMMERCIAL

## 2023-02-21 VITALS
OXYGEN SATURATION: 98 % | WEIGHT: 293 LBS | HEIGHT: 69 IN | RESPIRATION RATE: 16 BRPM | TEMPERATURE: 97.8 F | BODY MASS INDEX: 43.4 KG/M2 | SYSTOLIC BLOOD PRESSURE: 132 MMHG | HEART RATE: 79 BPM | DIASTOLIC BLOOD PRESSURE: 80 MMHG

## 2023-02-21 DIAGNOSIS — I10 ESSENTIAL HYPERTENSION: ICD-10-CM

## 2023-02-21 DIAGNOSIS — F41.1 GENERALIZED ANXIETY DISORDER: Primary | ICD-10-CM

## 2023-02-21 DIAGNOSIS — F32.1 MAJOR DEPRESSIVE DISORDER, SINGLE EPISODE, MODERATE: ICD-10-CM

## 2023-02-21 PROCEDURE — 99214 OFFICE O/P EST MOD 30 MIN: CPT | Performed by: NURSE PRACTITIONER

## 2023-02-21 RX ORDER — HYDROXYZINE HYDROCHLORIDE 25 MG/1
25 TABLET, FILM COATED ORAL 3 TIMES DAILY PRN
Qty: 90 TABLET | Refills: 0 | Status: SHIPPED | OUTPATIENT
Start: 2023-02-21

## 2023-02-21 RX ORDER — SERTRALINE HYDROCHLORIDE 25 MG/1
25 TABLET, FILM COATED ORAL DAILY
Qty: 30 TABLET | Refills: 0 | Status: SHIPPED | OUTPATIENT
Start: 2023-02-21 | End: 2023-03-13 | Stop reason: SDUPTHER

## 2023-02-21 NOTE — PROGRESS NOTES
Chief Complaint  Anxiety     Subjective          Janeth Moctezuma is a 37 y.o. female who presents today to [facility] for initial evaluation     HPI:   History of Present Illness    Presents for complaint of anxiety and depression.  Has had this since she suffered the loss of her father recently.  Associated symptoms: Feels anxious, irritable, has episodes of anxiety that is worse at times. Is interested in starting medication.  Denies SI/HI.  No other issues or concerns at this time.    The following portions of the patient's history were reviewed and updated as appropriate: allergies, current medications, past family history, past medical history, past social history, past surgical history and problem list.    Objective     Allergy:   Allergies   Allergen Reactions   • Penicillins Rash        Current Medications:   Current Outpatient Medications   Medication Sig Dispense Refill   • amLODIPine (NORVASC) 10 MG tablet Take 1 tablet by mouth Daily. 90 tablet 2   • aspirin 81 MG EC tablet Take 81 mg by mouth Daily.     • carvedilol (COREG) 25 MG tablet Take 1 tablet by mouth 2 (Two) Times a Day. 90 tablet 0   • cetirizine (zyrTEC) 10 MG tablet Take 1 tablet by mouth Daily. 90 tablet 3   • cloNIDine (CATAPRES) 0.1 MG tablet Take 1 tablet by mouth 3 (Three) Times a Day As Needed for High Blood Pressure (> 160/90). 30 tablet 1   • diclofenac (CATAFLAM) 50 MG tablet Take 50 mg by mouth 2 (Two) Times a Day As Needed.     • fluticasone (Flonase) 50 MCG/ACT nasal spray Use 2 sprays in each nostril daily as directed by provider. (Patient taking differently: 2 sprays into the nostril(s) as directed by provider As Needed.) 16 mL 5   • hydroCHLOROthiazide (HYDRODIURIL) 25 MG tablet Take 1 tablet by mouth Daily. 90 tablet 2   • levothyroxine (SYNTHROID, LEVOTHROID) 75 MCG tablet Take 1 tablet by mouth in the morning on an empty stomach 90 tablet 0   • metFORMIN ER (GLUCOPHAGE-XR) 500 MG 24 hr tablet  Take 2 tablets by mouth every morning and 2 tablets every evening. 120 tablet 0   • norgestimate-ethinyl estradiol (ORTHO TRI-CYCLEN,TRINESSA) 0.18/0.215/0.25 MG-35 MCG per tablet Take 1 tablet by mouth Daily. 28 tablet 7   • hydrOXYzine (ATARAX) 25 MG tablet Take 1 tablet by mouth 3 (Three) Times a Day As Needed for Anxiety. 90 tablet 0   • multivitamin with minerals tablet tablet Take 1 tablet by mouth Daily.     • sertraline (Zoloft) 25 MG tablet Take 1 tablet by mouth Daily. 30 tablet 0     Current Facility-Administered Medications   Medication Dose Route Frequency Provider Last Rate Last Admin   • methylPREDNISolone sodium succinate (SOLU-Medrol) injection 40 mg  40 mg Intramuscular Once Marybel Valle APRN           Past Medical History:  Past Medical History:   Diagnosis Date   • Hypertension    • Hypothyroidism    • Low back pain    • Migraines    • Polycystic disease, ovaries     PCOS   • Prediabetes        Past Surgical History:  Past Surgical History:   Procedure Laterality Date   •  SECTION     • FACIAL RECONSTRUCTION SURGERY  2017   • KNEE SURGERY         Social History:  Social History     Socioeconomic History   • Marital status:    Tobacco Use   • Smoking status: Never   • Smokeless tobacco: Never   Vaping Use   • Vaping Use: Never used   Substance and Sexual Activity   • Alcohol use: Yes     Comment: occasional    • Drug use: No   • Sexual activity: Yes     Partners: Male     Birth control/protection: Pill       Family History:  Family History   Problem Relation Age of Onset   • JUNAID disease Mother    • Thyroid disease Father    • Early death Father    • Diabetes Father    • Depression Father    • Anxiety disorder Father    • COPD Father    • Hypertension Father    • Hyperlipidemia Father    • Heart disease Father    • Heart attack Father    • Anemia Father    • JUNIAD disease Brother        Review of Systems:  Review of Systems   Psychiatric/Behavioral: Negative for self-injury and  "suicidal ideas. The patient is nervous/anxious.        Vital Signs:   /80 (BP Location: Right arm, Patient Position: Sitting, Cuff Size: Large Adult)   Pulse 79   Temp 97.8 °F (36.6 °C) (Temporal)   Resp 16   Ht 175.3 cm (69\")   Wt (!) 140 kg (307 lb 12.8 oz)   SpO2 98%   BMI 45.45 kg/m²      Physical Exam:  Physical Exam  Vitals and nursing note reviewed.   Constitutional:       General: She is not in acute distress.     Appearance: Normal appearance. She is not ill-appearing or toxic-appearing.   HENT:      Head: Normocephalic.   Cardiovascular:      Rate and Rhythm: Normal rate and regular rhythm.      Heart sounds: Normal heart sounds.   Pulmonary:      Effort: Pulmonary effort is normal. No respiratory distress.      Breath sounds: Normal breath sounds.   Skin:     General: Skin is warm and dry.      Coloration: Skin is not pale.   Neurological:      Mental Status: She is alert and oriented to person, place, and time.   Psychiatric:         Mood and Affect: Mood normal.         Behavior: Behavior normal.         Thought Content: Thought content normal.         Judgment: Judgment normal.                  Assessment and Plan   Diagnoses and all orders for this visit:    1. Generalized anxiety disorder (Primary)  -     sertraline (Zoloft) 25 MG tablet; Take 1 tablet by mouth Daily.  Dispense: 30 tablet; Refill: 0  -     hydrOXYzine (ATARAX) 25 MG tablet; Take 1 tablet by mouth 3 (Three) Times a Day As Needed for Anxiety.  Dispense: 90 tablet; Refill: 0    2. Major depressive disorder, single episode, moderate (HCC)  -     sertraline (Zoloft) 25 MG tablet; Take 1 tablet by mouth Daily.  Dispense: 30 tablet; Refill: 0    3. Essential hypertension    Will initiate trial as above  Monitor blood pressure, keep log, bring log to follow-up appointment, follow-up sooner for readings outside of established parameters.    Discussed possible differential diagnoses, testing, treatment, recommended " non-pharmacological interventions, risks, warning signs to monitor for that would indicate need for follow-up in clinic or ER. If no improvement with these regimens or you have new or worsening symptoms follow-up. Patient verbalizes understanding and agreement with plan of care. Denies further needs or concerns.     Patient was given instructions and counseling regarding her condition and for health maintenance advised.    Class 3 Severe Obesity (BMI >=40). Obesity-related health conditions include the following: obstructive sleep apnea, hypertension, coronary heart disease, diabetes mellitus, dyslipidemias, GERD and peripheral vascular disease. Obesity is worsening. BMI is is above average; BMI management plan is completed. We discussed portion control and increasing exercise.     I spent 30 minutes caring for patient on this date of service. This time includes time spent by me in the following activities: preparing for the visit, reviewing tests, obtaining and/or reviewing a separately obtained history, performing a medically appropriate examination and/or evaluation, counseling and educating the patient/family/caregiver, ordering medications, tests, or procedures and documenting information in the medical record    Meds ordered during this visit:  New Medications Ordered This Visit   Medications   • sertraline (Zoloft) 25 MG tablet     Sig: Take 1 tablet by mouth Daily.     Dispense:  30 tablet     Refill:  0   • hydrOXYzine (ATARAX) 25 MG tablet     Sig: Take 1 tablet by mouth 3 (Three) Times a Day As Needed for Anxiety.     Dispense:  90 tablet     Refill:  0       Patient Instructions:  Patient instructions given for the following visit diagnosis:    ICD-10-CM ICD-9-CM   1. Generalized anxiety disorder  F41.1 300.02   2. Major depressive disorder, single episode, moderate (HCC)  F32.1 296.22   3. Essential hypertension  I10 401.9       Follow Up   Return in about 1 month (around 3/21/2023) for Recheck, Sooner  if needed.        This document has been electronically signed by GERDA Soriano  February 21, 2023 09:29 EST    Patient was given instructions and counseling regarding her condition or for health maintenance advice. Please see specific information pulled into the AVS if appropriate.     Part of this note may be an electronic transcription/translation of spoken language to printed text using the Dragon Dictation System.

## 2023-03-13 DIAGNOSIS — F41.1 GENERALIZED ANXIETY DISORDER: ICD-10-CM

## 2023-03-13 DIAGNOSIS — F32.1 MAJOR DEPRESSIVE DISORDER, SINGLE EPISODE, MODERATE: ICD-10-CM

## 2023-03-14 RX ORDER — SERTRALINE HYDROCHLORIDE 25 MG/1
25 TABLET, FILM COATED ORAL DAILY
Qty: 90 TABLET | Refills: 0 | Status: SHIPPED | OUTPATIENT
Start: 2023-03-14

## 2023-03-14 NOTE — TELEPHONE ENCOUNTER
Patient requesting refill medication. Reports this is helping with her symptoms and she is tolerating medication well with no issues or side effects.  Denies SI/HI.

## 2023-04-28 ENCOUNTER — OFFICE VISIT (OUTPATIENT)
Dept: FAMILY MEDICINE CLINIC | Facility: CLINIC | Age: 38
End: 2023-04-28
Payer: COMMERCIAL

## 2023-04-28 VITALS
WEIGHT: 293 LBS | HEART RATE: 69 BPM | OXYGEN SATURATION: 98 % | BODY MASS INDEX: 43.4 KG/M2 | RESPIRATION RATE: 16 BRPM | TEMPERATURE: 97.1 F | DIASTOLIC BLOOD PRESSURE: 84 MMHG | HEIGHT: 69 IN | SYSTOLIC BLOOD PRESSURE: 108 MMHG

## 2023-04-28 DIAGNOSIS — F32.1 MAJOR DEPRESSIVE DISORDER, SINGLE EPISODE, MODERATE: ICD-10-CM

## 2023-04-28 DIAGNOSIS — F41.1 GENERALIZED ANXIETY DISORDER: Primary | ICD-10-CM

## 2023-04-28 DIAGNOSIS — R73.03 PREDIABETES: ICD-10-CM

## 2023-04-28 DIAGNOSIS — E53.8 VITAMIN B12 DEFICIENCY: ICD-10-CM

## 2023-04-28 DIAGNOSIS — M54.50 CHRONIC BILATERAL LOW BACK PAIN WITHOUT SCIATICA: ICD-10-CM

## 2023-04-28 DIAGNOSIS — E78.5 DYSLIPIDEMIA: ICD-10-CM

## 2023-04-28 DIAGNOSIS — E55.9 VITAMIN D DEFICIENCY: ICD-10-CM

## 2023-04-28 DIAGNOSIS — G89.29 CHRONIC BILATERAL LOW BACK PAIN WITHOUT SCIATICA: ICD-10-CM

## 2023-04-28 DIAGNOSIS — E03.9 ACQUIRED HYPOTHYROIDISM: ICD-10-CM

## 2023-04-28 PROCEDURE — 99214 OFFICE O/P EST MOD 30 MIN: CPT | Performed by: NURSE PRACTITIONER

## 2023-04-28 RX ORDER — DICLOFENAC POTASSIUM 50 MG/1
50 TABLET, FILM COATED ORAL 2 TIMES DAILY PRN
Qty: 60 TABLET | Refills: 0 | Status: SHIPPED | OUTPATIENT
Start: 2023-04-28

## 2023-04-28 RX ORDER — LEVOTHYROXINE SODIUM 0.07 MG/1
TABLET ORAL
Qty: 90 TABLET | Refills: 1 | Status: SHIPPED | OUTPATIENT
Start: 2023-04-28

## 2023-04-28 RX ORDER — SERTRALINE HYDROCHLORIDE 25 MG/1
25 TABLET, FILM COATED ORAL DAILY
Qty: 90 TABLET | Refills: 1 | Status: SHIPPED | OUTPATIENT
Start: 2023-04-28

## 2023-04-28 NOTE — PROGRESS NOTES
Chief Complaint  Anxiety    Subjective          Janeth Moctezuma is a 37 y.o. female who presents today to [facility] for follow up    HPI:   History of Present Illness    Presents for follow-up and medication refill.  Bashir is tolerating medications well with no issues or side effects.  Denies any other issues or concerns at this time.    The following portions of the patient's history were reviewed and updated as appropriate: allergies, current medications, past family history, past medical history, past social history, past surgical history and problem list.    Objective     Allergy:   Allergies   Allergen Reactions   • Penicillins Rash        Current Medications:   Current Outpatient Medications   Medication Sig Dispense Refill   • amLODIPine (NORVASC) 10 MG tablet Take 1 tablet by mouth Daily. 90 tablet 2   • aspirin 81 MG EC tablet Take 1 tablet by mouth Daily.     • carvedilol (COREG) 25 MG tablet Take 1 tablet by mouth 2 (Two) Times a Day. 90 tablet 0   • cetirizine (zyrTEC) 10 MG tablet Take 1 tablet by mouth Daily. 90 tablet 3   • cloNIDine (CATAPRES) 0.1 MG tablet Take 1 tablet by mouth 3 (Three) Times a Day As Needed for High Blood Pressure (> 160/90). 30 tablet 1   • diclofenac (CATAFLAM) 50 MG tablet Take 1 tablet by mouth 2 (Two) Times a Day As Needed (back pain). 60 tablet 0   • hydroCHLOROthiazide (HYDRODIURIL) 25 MG tablet Take 1 tablet by mouth Daily. 90 tablet 2   • hydrOXYzine (ATARAX) 25 MG tablet Take 1 tablet by mouth 3 (Three) Times a Day As Needed for Anxiety. 90 tablet 0   • levothyroxine (SYNTHROID, LEVOTHROID) 75 MCG tablet Take 1 tablet by mouth in the morning on an empty stomach 90 tablet 1   • metFORMIN ER (GLUCOPHAGE-XR) 500 MG 24 hr tablet Take 2 tablets by mouth every morning and every evening. 120 tablet 0   • multivitamin with minerals tablet tablet Take 1 tablet by mouth Daily.     • norgestimate-ethinyl estradiol (ORTHO TRI-CYCLEN,TRINESSA)  0.18/0.215/0.25 MG-35 MCG per tablet Take 1 tablet by mouth Daily. 28 tablet 7   • sertraline (Zoloft) 25 MG tablet Take 1 tablet by mouth Daily. 90 tablet 1     Current Facility-Administered Medications   Medication Dose Route Frequency Provider Last Rate Last Admin   • methylPREDNISolone sodium succinate (SOLU-Medrol) injection 40 mg  40 mg Intramuscular Once Marybel Valle APRN           Past Medical History:  Past Medical History:   Diagnosis Date   • Anxiety    • Hypertension    • Hypothyroidism    • Low back pain    • Migraines    • Polycystic disease, ovaries     PCOS   • Prediabetes        Past Surgical History:  Past Surgical History:   Procedure Laterality Date   •  SECTION  2021    [facility], DR. STOVALL   • FACIAL RECONSTRUCTION SURGERY  2017   • KNEE SURGERY         Social History:  Social History     Socioeconomic History   • Marital status:    Tobacco Use   • Smoking status: Never   • Smokeless tobacco: Never   Vaping Use   • Vaping Use: Never used   Substance and Sexual Activity   • Alcohol use: Not Currently     Comment: occasional    • Drug use: No   • Sexual activity: Yes     Partners: Male     Birth control/protection: Pill       Family History:  Family History   Problem Relation Age of Onset   • JUNAID disease Mother    • Hearing loss Father    • Thyroid disease Father    • Early death Father    • Diabetes Father    • Depression Father    • Anxiety disorder Father    • COPD Father    • Hypertension Father    • Hyperlipidemia Father    • Heart disease Father    • Heart attack Father    • Anemia Father    • Hypertension Brother    • JUNAID disease Brother    • No Known Problems Son    • Diabetes Maternal Uncle    • Hyperlipidemia Paternal Aunt    • Heart disease Paternal Aunt    • Hypertension Paternal Aunt    • Thyroid disease Paternal Aunt    • Anxiety disorder Paternal Aunt    • Mental illness Maternal Grandfather    • Heart attack Paternal Grandmother    • Vision loss  "Paternal Grandmother    • Stroke Paternal Grandmother    • Hypertension Paternal Grandmother    • Hyperlipidemia Paternal Grandmother    • Heart disease Paternal Grandmother    • Hearing loss Paternal Grandmother    • COPD Paternal Grandmother    • Early death Paternal Grandfather    • Other Paternal Grandfather         BLACK LUNG       Review of Systems:  Review of Systems   Constitutional: Negative for unexpected weight change.   Endocrine: Negative for cold intolerance, heat intolerance and polydipsia.   Neurological: Negative for weakness and numbness.   Psychiatric/Behavioral: Negative for self-injury and suicidal ideas.       Vital Signs:   /84 (BP Location: Right arm, Patient Position: Sitting, Cuff Size: Large Adult)   Pulse 69   Temp 97.1 °F (36.2 °C) (Temporal)   Resp 16   Ht 175.3 cm (69\")   Wt 136 kg (299 lb 3.2 oz)   SpO2 98%   BMI 44.18 kg/m²      Physical Exam:  Physical Exam  Vitals and nursing note reviewed.   Constitutional:       General: She is not in acute distress.     Appearance: Normal appearance. She is not ill-appearing or toxic-appearing.   HENT:      Head: Normocephalic.   Cardiovascular:      Rate and Rhythm: Normal rate and regular rhythm.      Heart sounds: Normal heart sounds.   Pulmonary:      Effort: Pulmonary effort is normal. No respiratory distress.      Breath sounds: Normal breath sounds.   Skin:     General: Skin is warm and dry.      Coloration: Skin is not pale.   Neurological:      General: No focal deficit present.      Mental Status: She is alert and oriented to person, place, and time.   Psychiatric:         Mood and Affect: Mood normal.         Behavior: Behavior normal.         Thought Content: Thought content normal.         Judgment: Judgment normal.                  Assessment and Plan   Diagnoses and all orders for this visit:    1. Generalized anxiety disorder (Primary)  -     sertraline (Zoloft) 25 MG tablet; Take 1 tablet by mouth Daily.  Dispense: " 90 tablet; Refill: 1  -     CBC & Differential  -     Comprehensive Metabolic Panel  -     TSH; Future    2. Major depressive disorder, single episode, moderate  -     sertraline (Zoloft) 25 MG tablet; Take 1 tablet by mouth Daily.  Dispense: 90 tablet; Refill: 1  -     CBC & Differential  -     Comprehensive Metabolic Panel  -     TSH; Future    3. Dyslipidemia  -     CBC & Differential  -     Comprehensive Metabolic Panel  -     Lipid Panel  -     TSH; Future    4. Prediabetes  -     CBC & Differential  -     Comprehensive Metabolic Panel  -     Hemoglobin A1c  -     TSH; Future    5. Acquired hypothyroidism  -     levothyroxine (SYNTHROID, LEVOTHROID) 75 MCG tablet; Take 1 tablet by mouth in the morning on an empty stomach  Dispense: 90 tablet; Refill: 1  -     CBC & Differential  -     Comprehensive Metabolic Panel  -     TSH; Future    6. Chronic bilateral low back pain without sciatica  -     diclofenac (CATAFLAM) 50 MG tablet; Take 1 tablet by mouth 2 (Two) Times a Day As Needed (back pain).  Dispense: 60 tablet; Refill: 0    7. Vitamin D deficiency  -     Vitamin D,25-Hydroxy    8. Vitamin B12 deficiency  -     Vitamin B12      Diet and lifestyle modifications to control conditions.  Agreeable to fasting labs.  RICE therapy. Take sparingly and with food.       Discussed possible differential diagnoses, testing, treatment, recommended non-pharmacological interventions, risks, warning signs to monitor for that would indicate need for follow-up in clinic or ER. If no improvement with these regimens or you have new or worsening symptoms follow-up. Patient verbalizes understanding and agreement with plan of care. Denies further needs or concerns.     Patient was given instructions and counseling regarding her condition and for health maintenance advised.    Class 3 Severe Obesity (BMI >=40). Obesity-related health conditions include the following: obstructive sleep apnea, hypertension, coronary heart disease,  diabetes mellitus, dyslipidemias, GERD and peripheral vascular disease. Obesity is improving with lifestyle modifications. BMI is is above average; BMI management plan is completed. We discussed portion control and increasing exercise.       I spent 30 minutes caring for patient on this date of service. This time includes time spent by me in the following activities: preparing for the visit, reviewing tests, obtaining and/or reviewing a separately obtained history, performing a medically appropriate examination and/or evaluation, counseling and educating the patient/family/caregiver, ordering medications, tests, or procedures and documenting information in the medical record    Meds ordered during this visit:  New Medications Ordered This Visit   Medications   • levothyroxine (SYNTHROID, LEVOTHROID) 75 MCG tablet     Sig: Take 1 tablet by mouth in the morning on an empty stomach     Dispense:  90 tablet     Refill:  1   • sertraline (Zoloft) 25 MG tablet     Sig: Take 1 tablet by mouth Daily.     Dispense:  90 tablet     Refill:  1   • diclofenac (CATAFLAM) 50 MG tablet     Sig: Take 1 tablet by mouth 2 (Two) Times a Day As Needed (back pain).     Dispense:  60 tablet     Refill:  0       Patient Instructions:  Patient instructions given for the following visit diagnosis:    ICD-10-CM ICD-9-CM   1. Generalized anxiety disorder  F41.1 300.02   2. Major depressive disorder, single episode, moderate  F32.1 296.22   3. Dyslipidemia  E78.5 272.4   4. Prediabetes  R73.03 790.29   5. Acquired hypothyroidism  E03.9 244.9   6. Chronic bilateral low back pain without sciatica  M54.50 724.2    G89.29 338.29   7. Vitamin D deficiency  E55.9 268.9   8. Vitamin B12 deficiency  E53.8 266.2       Follow Up   Return for Recheck in 3-6 months, sooner if needed .        This document has been electronically signed by GERDA Soriano  April 28, 2023 16:49 EDT    Patient was given instructions and counseling regarding her condition or  for health maintenance advice. Please see specific information pulled into the AVS if appropriate.     Part of this note may be an electronic transcription/translation of spoken language to printed text using the Dragon Dictation System.

## 2023-05-03 ENCOUNTER — LAB (OUTPATIENT)
Dept: FAMILY MEDICINE CLINIC | Facility: CLINIC | Age: 38
End: 2023-05-03
Payer: COMMERCIAL

## 2023-05-03 DIAGNOSIS — E78.5 DYSLIPIDEMIA: ICD-10-CM

## 2023-05-03 DIAGNOSIS — R73.03 PREDIABETES: ICD-10-CM

## 2023-05-03 DIAGNOSIS — F32.1 MAJOR DEPRESSIVE DISORDER, SINGLE EPISODE, MODERATE: ICD-10-CM

## 2023-05-03 DIAGNOSIS — F41.1 GENERALIZED ANXIETY DISORDER: ICD-10-CM

## 2023-05-03 DIAGNOSIS — E03.9 ACQUIRED HYPOTHYROIDISM: ICD-10-CM

## 2023-05-03 PROCEDURE — 82607 VITAMIN B-12: CPT | Performed by: NURSE PRACTITIONER

## 2023-05-03 PROCEDURE — 80061 LIPID PANEL: CPT | Performed by: NURSE PRACTITIONER

## 2023-05-03 PROCEDURE — 80050 GENERAL HEALTH PANEL: CPT | Performed by: NURSE PRACTITIONER

## 2023-05-03 PROCEDURE — 83036 HEMOGLOBIN GLYCOSYLATED A1C: CPT | Performed by: NURSE PRACTITIONER

## 2023-05-03 PROCEDURE — 82306 VITAMIN D 25 HYDROXY: CPT | Performed by: NURSE PRACTITIONER

## 2023-05-04 LAB
25(OH)D3 SERPL-MCNC: 42 NG/ML (ref 30–100)
ALBUMIN SERPL-MCNC: 4.2 G/DL (ref 3.5–5.2)
ALBUMIN/GLOB SERPL: 1.4 G/DL
ALP SERPL-CCNC: 88 U/L (ref 39–117)
ALT SERPL W P-5'-P-CCNC: 34 U/L (ref 1–33)
ANION GAP SERPL CALCULATED.3IONS-SCNC: 11 MMOL/L (ref 5–15)
AST SERPL-CCNC: 25 U/L (ref 1–32)
BASOPHILS # BLD AUTO: 0.06 10*3/MM3 (ref 0–0.2)
BASOPHILS NFR BLD AUTO: 1.1 % (ref 0–1.5)
BILIRUB SERPL-MCNC: 0.6 MG/DL (ref 0–1.2)
BUN SERPL-MCNC: 11 MG/DL (ref 6–20)
BUN/CREAT SERPL: 15.9 (ref 7–25)
CALCIUM SPEC-SCNC: 9.1 MG/DL (ref 8.6–10.5)
CHLORIDE SERPL-SCNC: 100 MMOL/L (ref 98–107)
CHOLEST SERPL-MCNC: 174 MG/DL (ref 0–200)
CO2 SERPL-SCNC: 29 MMOL/L (ref 22–29)
CREAT SERPL-MCNC: 0.69 MG/DL (ref 0.57–1)
DEPRECATED RDW RBC AUTO: 39.3 FL (ref 37–54)
EGFRCR SERPLBLD CKD-EPI 2021: 116.2 ML/MIN/1.73
EOSINOPHIL # BLD AUTO: 0.14 10*3/MM3 (ref 0–0.4)
EOSINOPHIL NFR BLD AUTO: 2.7 % (ref 0.3–6.2)
ERYTHROCYTE [DISTWIDTH] IN BLOOD BY AUTOMATED COUNT: 14.1 % (ref 12.3–15.4)
GLOBULIN UR ELPH-MCNC: 3.1 GM/DL
GLUCOSE SERPL-MCNC: 95 MG/DL (ref 65–99)
HBA1C MFR BLD: 5.6 % (ref 4.8–5.6)
HCT VFR BLD AUTO: 41.3 % (ref 34–46.6)
HDLC SERPL-MCNC: 46 MG/DL (ref 40–60)
HGB BLD-MCNC: 13.9 G/DL (ref 12–15.9)
IMM GRANULOCYTES # BLD AUTO: 0.02 10*3/MM3 (ref 0–0.05)
IMM GRANULOCYTES NFR BLD AUTO: 0.4 % (ref 0–0.5)
LDLC SERPL CALC-MCNC: 99 MG/DL (ref 0–100)
LDLC/HDLC SERPL: 2.06 {RATIO}
LYMPHOCYTES # BLD AUTO: 1.72 10*3/MM3 (ref 0.7–3.1)
LYMPHOCYTES NFR BLD AUTO: 32.8 % (ref 19.6–45.3)
MCH RBC QN AUTO: 26.2 PG (ref 26.6–33)
MCHC RBC AUTO-ENTMCNC: 33.7 G/DL (ref 31.5–35.7)
MCV RBC AUTO: 77.8 FL (ref 79–97)
MONOCYTES # BLD AUTO: 0.4 10*3/MM3 (ref 0.1–0.9)
MONOCYTES NFR BLD AUTO: 7.6 % (ref 5–12)
NEUTROPHILS NFR BLD AUTO: 2.91 10*3/MM3 (ref 1.7–7)
NEUTROPHILS NFR BLD AUTO: 55.4 % (ref 42.7–76)
NRBC BLD AUTO-RTO: 0 /100 WBC (ref 0–0.2)
PLATELET # BLD AUTO: 320 10*3/MM3 (ref 140–450)
PMV BLD AUTO: 10.7 FL (ref 6–12)
POTASSIUM SERPL-SCNC: 4.2 MMOL/L (ref 3.5–5.2)
PROT SERPL-MCNC: 7.3 G/DL (ref 6–8.5)
RBC # BLD AUTO: 5.31 10*6/MM3 (ref 3.77–5.28)
SODIUM SERPL-SCNC: 140 MMOL/L (ref 136–145)
TRIGL SERPL-MCNC: 166 MG/DL (ref 0–150)
TSH SERPL DL<=0.05 MIU/L-ACNC: 2.07 UIU/ML (ref 0.27–4.2)
VIT B12 BLD-MCNC: 600 PG/ML (ref 211–946)
VLDLC SERPL-MCNC: 29 MG/DL (ref 5–40)
WBC NRBC COR # BLD: 5.25 10*3/MM3 (ref 3.4–10.8)

## 2023-05-08 RX ORDER — HYDROCHLOROTHIAZIDE 25 MG/1
25 TABLET ORAL DAILY
Qty: 90 TABLET | Refills: 2 | Status: SHIPPED | OUTPATIENT
Start: 2023-05-08

## 2023-05-08 RX ORDER — AMLODIPINE BESYLATE 10 MG/1
10 TABLET ORAL DAILY
Qty: 90 TABLET | Refills: 2 | Status: SHIPPED | OUTPATIENT
Start: 2023-05-08

## 2023-05-09 DIAGNOSIS — M54.9 ACUTE BILATERAL BACK PAIN, UNSPECIFIED BACK LOCATION: Primary | ICD-10-CM

## 2023-05-09 DIAGNOSIS — M54.2 NECK PAIN: ICD-10-CM

## 2023-05-09 NOTE — H&P
S: Presents requesting xrays of spine for chronic neck and back pain. Has been evaluated by her chiropractor and presents with order for x-rays of cervical, thoracic, lumbar spinal x-rays. Patient denies loss of bowel/bladder. Denies any numbness or weakness in extremities. Denies any other issues or concerns at this time.     O: Neurovascular status intact    A: Chronic neck and back pain    P: Xray to evaluate status. Rest, ice compression.     Follow up in 1-2 weeks, sooner if needed.

## 2023-05-15 ENCOUNTER — OFFICE VISIT (OUTPATIENT)
Dept: CARDIOLOGY | Facility: CLINIC | Age: 38
End: 2023-05-15
Payer: COMMERCIAL

## 2023-05-15 VITALS
HEART RATE: 72 BPM | HEIGHT: 69 IN | BODY MASS INDEX: 43.4 KG/M2 | OXYGEN SATURATION: 98 % | DIASTOLIC BLOOD PRESSURE: 100 MMHG | SYSTOLIC BLOOD PRESSURE: 130 MMHG | WEIGHT: 293 LBS

## 2023-05-15 DIAGNOSIS — R53.83 OTHER FATIGUE: ICD-10-CM

## 2023-05-15 DIAGNOSIS — R06.09 DYSPNEA ON EXERTION: ICD-10-CM

## 2023-05-15 DIAGNOSIS — I10 ESSENTIAL HYPERTENSION: Primary | ICD-10-CM

## 2023-05-15 PROCEDURE — 99213 OFFICE O/P EST LOW 20 MIN: CPT | Performed by: PHYSICIAN ASSISTANT

## 2023-05-15 NOTE — PROGRESS NOTES
Subjective   Janeth Moctezuma is a 37 y.o. female     Chief Complaint   Patient presents with   • Follow-up     Dyspnea on exertion       HPI  The patient presents back to the clinic today for routine evaluation and follow-up.  We have seen the patient historically because of hypertension and symptoms noted in the past.  Prior work-up including stress and echo studies, performed remotely, have been benign.  We have followed her clinically since.  At this time, the patient reports no significant chest pain.  She has intermittent palpitations but no significant dysrhythmic activity.  Dyspnea is at baseline.  She has no failure symptoms.  For the most part, she feels well at this time and has no further complaints.      Current Outpatient Medications   Medication Sig Dispense Refill   • amLODIPine (NORVASC) 10 MG tablet Take 1 tablet by mouth Daily. 90 tablet 2   • aspirin 81 MG EC tablet Take 1 tablet by mouth Daily.     • carvedilol (COREG) 25 MG tablet Take 1 tablet by mouth 2 (Two) Times a Day. 90 tablet 0   • cetirizine (zyrTEC) 10 MG tablet Take 1 tablet by mouth Daily. 90 tablet 3   • cloNIDine (CATAPRES) 0.1 MG tablet Take 1 tablet by mouth 3 (Three) Times a Day As Needed for High Blood Pressure (> 160/90). 30 tablet 1   • diclofenac (CATAFLAM) 50 MG tablet Take 1 tablet by mouth 2 (Two) Times a Day As Needed (back pain). 60 tablet 0   • hydroCHLOROthiazide (HYDRODIURIL) 25 MG tablet Take 1 tablet by mouth Daily. 90 tablet 2   • hydrOXYzine (ATARAX) 25 MG tablet Take 1 tablet by mouth 3 (Three) Times a Day As Needed for Anxiety. 90 tablet 0   • levothyroxine (SYNTHROID, LEVOTHROID) 75 MCG tablet Take 1 tablet by mouth in the morning on an empty stomach 90 tablet 1   • metFORMIN ER (GLUCOPHAGE-XR) 500 MG 24 hr tablet Take 2 tablets by mouth every morning and every evening. 120 tablet 0   • multivitamin with minerals tablet tablet Take 1 tablet by mouth Daily.     • norgestimate-ethinyl estradiol (ORTHO  TRI-CYCLEN,TRINESSA) 0.18/0.215/0.25 MG-35 MCG per tablet Take 1 tablet by mouth Daily. 28 tablet 7   • sertraline (Zoloft) 25 MG tablet Take 1 tablet by mouth Daily. 90 tablet 1     Current Facility-Administered Medications   Medication Dose Route Frequency Provider Last Rate Last Admin   • methylPREDNISolone sodium succinate (SOLU-Medrol) injection 40 mg  40 mg Intramuscular Once Tori, Marybel, APRN           Penicillins    Past Medical History:   Diagnosis Date   • Anxiety    • Hypertension    • Hypothyroidism    • Low back pain    • Migraines    • Polycystic disease, ovaries     PCOS   • Prediabetes        Social History     Socioeconomic History   • Marital status:    Tobacco Use   • Smoking status: Never   • Smokeless tobacco: Never   Vaping Use   • Vaping Use: Never used   Substance and Sexual Activity   • Alcohol use: Not Currently     Comment: occasional    • Drug use: No   • Sexual activity: Yes     Partners: Male     Birth control/protection: Pill       Family History   Problem Relation Age of Onset   • JUNAID disease Mother    • Hearing loss Father    • Thyroid disease Father    • Early death Father    • Diabetes Father    • Depression Father    • Anxiety disorder Father    • COPD Father    • Hypertension Father    • Hyperlipidemia Father    • Heart disease Father    • Heart attack Father    • Anemia Father    • Hypertension Brother    • JUNAID disease Brother    • No Known Problems Son    • Diabetes Maternal Uncle    • Hyperlipidemia Paternal Aunt    • Heart disease Paternal Aunt    • Hypertension Paternal Aunt    • Thyroid disease Paternal Aunt    • Anxiety disorder Paternal Aunt    • Mental illness Maternal Grandfather    • Heart attack Paternal Grandmother    • Vision loss Paternal Grandmother    • Stroke Paternal Grandmother    • Hypertension Paternal Grandmother    • Hyperlipidemia Paternal Grandmother    • Heart disease Paternal Grandmother    • Hearing loss Paternal Grandmother    • COPD Paternal  "Grandmother    • Early death Paternal Grandfather    • Other Paternal Grandfather         BLACK LUNG       Review of Systems   Constitutional: Negative.  Negative for activity change, appetite change, chills, fatigue and fever.   Eyes: Negative.  Negative for visual disturbance.   Respiratory: Negative.  Negative for apnea, cough, chest tightness, shortness of breath and wheezing.    Cardiovascular: Positive for leg swelling. Negative for chest pain and palpitations.   Gastrointestinal: Negative.  Negative for blood in stool.   Endocrine: Negative.  Negative for cold intolerance and heat intolerance.   Genitourinary: Negative.  Negative for hematuria.   Skin: Negative for color change, rash and wound.   Allergic/Immunologic: Positive for environmental allergies. Negative for food allergies.   Neurological: Positive for numbness and headaches. Negative for dizziness, syncope, weakness and light-headedness.   Hematological: Negative.  Does not bruise/bleed easily.   Psychiatric/Behavioral: Positive for sleep disturbance.       Objective     Vitals:    05/15/23 1605   BP: 130/100   BP Location: Left arm   Patient Position: Sitting   Cuff Size: Adult   Pulse: 72   SpO2: 98%   Weight: (!) 137 kg (303 lb)   Height: 175.3 cm (69\")        /100 (BP Location: Left arm, Patient Position: Sitting, Cuff Size: Adult)   Pulse 72   Ht 175.3 cm (69\")   Wt (!) 137 kg (303 lb)   LMP 04/26/2023 (Exact Date)   SpO2 98%   BMI 44.75 kg/m²      Lab Results (most recent)     None          Physical Exam  Vitals and nursing note reviewed.   Constitutional:       General: She is not in acute distress.     Appearance: She is well-developed.   HENT:      Head: Normocephalic and atraumatic.   Eyes:      Conjunctiva/sclera: Conjunctivae normal.      Pupils: Pupils are equal, round, and reactive to light.   Neck:      Vascular: No JVD.      Trachea: No tracheal deviation.   Cardiovascular:      Rate and Rhythm: Normal rate and regular " rhythm.      Heart sounds: Normal heart sounds.   Pulmonary:      Effort: Pulmonary effort is normal.      Breath sounds: Normal breath sounds.   Abdominal:      General: Bowel sounds are normal. There is no distension.      Palpations: Abdomen is soft. There is no mass.      Tenderness: There is no abdominal tenderness. There is no guarding or rebound.   Musculoskeletal:         General: No tenderness or deformity. Normal range of motion.      Cervical back: Normal range of motion and neck supple.   Skin:     General: Skin is warm and dry.      Coloration: Skin is not pale.      Findings: No erythema or rash.   Neurological:      Mental Status: She is alert and oriented to person, place, and time.   Psychiatric:         Behavior: Behavior normal.         Thought Content: Thought content normal.         Judgment: Judgment normal.         Procedure   Procedures         Assessment & Plan      Diagnosis Plan   1. Essential hypertension        2. Dyspnea on exertion        3. Other fatigue          1.  At this time, the patient appears to be doing well for the most part.  Dyspnea and fatigue are at baseline.  She has no further cardiovascular symptoms of any concern.  Prior noninvasive cardiac evaluation has been benign.  I do not feel further work-up is warranted for now as the patient is stable.  For change in clinical course she will call immediately.    2.  She is hypertensive today but typically normotensive when checked at home.  She will monitor this closely and call for complications.    3.  I would continue medical regimen for now without change.    4.  We will continue to see the patient on routine 6-month intervals, sooner for complications.         Patient did not bring med list or medicine bottles to appointment, med list has been reviewed and updated based on patient's knowledge of their meds.             Electronically signed by:

## 2023-09-27 ENCOUNTER — OFFICE VISIT (OUTPATIENT)
Dept: FAMILY MEDICINE CLINIC | Facility: CLINIC | Age: 38
End: 2023-09-27
Payer: COMMERCIAL

## 2023-09-27 VITALS
DIASTOLIC BLOOD PRESSURE: 83 MMHG | HEIGHT: 69 IN | SYSTOLIC BLOOD PRESSURE: 132 MMHG | OXYGEN SATURATION: 97 % | TEMPERATURE: 98 F | RESPIRATION RATE: 16 BRPM | WEIGHT: 293 LBS | BODY MASS INDEX: 43.4 KG/M2 | HEART RATE: 77 BPM

## 2023-09-27 DIAGNOSIS — E03.9 ACQUIRED HYPOTHYROIDISM: ICD-10-CM

## 2023-09-27 DIAGNOSIS — F32.1 MAJOR DEPRESSIVE DISORDER, SINGLE EPISODE, MODERATE: ICD-10-CM

## 2023-09-27 DIAGNOSIS — E28.2 PCOS (POLYCYSTIC OVARIAN SYNDROME): ICD-10-CM

## 2023-09-27 DIAGNOSIS — F41.1 GENERALIZED ANXIETY DISORDER: ICD-10-CM

## 2023-09-27 DIAGNOSIS — E66.01 MORBID OBESITY: ICD-10-CM

## 2023-09-27 RX ORDER — LEVOTHYROXINE SODIUM 0.07 MG/1
TABLET ORAL
Qty: 90 TABLET | Refills: 3 | Status: SHIPPED | OUTPATIENT
Start: 2023-09-27

## 2023-09-27 RX ORDER — SEMAGLUTIDE 0.25 MG/.5ML
0.25 INJECTION, SOLUTION SUBCUTANEOUS WEEKLY
Qty: 2 ML | Refills: 0 | Status: SHIPPED | OUTPATIENT
Start: 2023-09-27

## 2023-09-27 RX ORDER — METFORMIN HYDROCHLORIDE 500 MG/1
TABLET, EXTENDED RELEASE ORAL
Qty: 120 TABLET | Refills: 3 | Status: SHIPPED | OUTPATIENT
Start: 2023-09-27

## 2023-09-27 NOTE — PROGRESS NOTES
Chief Complaint  Obesity    HPI:   Obesity     Janeth Moctezuma is a 37 y.o. female who presents today to [facility] for Obesity. Patient has a past medical history of fairly significant HTN, on carvedilol, amlodipine, HCTZ, carvedilol, and clonidine as needed. She has seen cardiology and had a negative workup. She has a history of hasimotos thyroiditi, on levothyroxine 75mg. She has a history of MDD, currently on 25mg xzoloft which was helping but feels like it has lost its effect. She takes 2000mg metformin for PCOS. She would like to consider pharmacotherapy for weight loss. She has tried to follow a diet and exercise plan without significant weight loss.     Previous History:   Past Medical History:   Diagnosis Date    Anxiety     Hypertension     Hypothyroidism     Low back pain     Migraines     Other specified disorders involving the immune mechanism, not elsewhere classified     JORGE A-ROMBERG SYNDROME    Polycystic disease, ovaries     PCOS    Prediabetes       Past Surgical History:   Procedure Laterality Date     SECTION  2021    [facility], DR. STOVALL    FACIAL RECONSTRUCTION SURGERY  2017    KNEE SURGERY  2002      Social History     Socioeconomic History    Marital status:    Tobacco Use    Smoking status: Never    Smokeless tobacco: Never   Vaping Use    Vaping Use: Never used   Substance and Sexual Activity    Alcohol use: Not Currently     Comment: occasional     Drug use: No    Sexual activity: Yes     Partners: Male     Birth control/protection: I.U.D.      Health Maintenance Due   Topic Date Due    TDAP/TD VACCINES (1 - Tdap) Never done    HEPATITIS C SCREENING  Never done    ANNUAL PHYSICAL  Never done    PAP SMEAR  Never done    COVID-19 Vaccine (3 - Moderna series) 2022        Current Medications:  Current Outpatient Medications   Medication Sig Dispense Refill    amLODIPine (NORVASC) 10 MG tablet Take 1 tablet by mouth Daily. 90 tablet  "2    aspirin 81 MG EC tablet Take 1 tablet by mouth Daily.      carvedilol (COREG) 25 MG tablet Take 1 tablet by mouth 2 (Two) Times a Day. 90 tablet 0    cetirizine (zyrTEC) 10 MG tablet Take 1 tablet by mouth Daily. 90 tablet 3    cloNIDine (CATAPRES) 0.1 MG tablet Take 1 tablet by mouth 3 (Three) Times a Day As Needed for High Blood Pressure (> 160/90). 30 tablet 1    diclofenac (CATAFLAM) 50 MG tablet Take 1 tablet by mouth 2 (Two) Times a Day As Needed (back pain). 60 tablet 0    hydroCHLOROthiazide (HYDRODIURIL) 25 MG tablet Take 1 tablet by mouth Daily. 90 tablet 2    hydrOXYzine (ATARAX) 25 MG tablet Take 1 tablet by mouth 3 (Three) Times a Day As Needed for Anxiety. 90 tablet 0    levothyroxine (SYNTHROID, LEVOTHROID) 75 MCG tablet Take 1 tablet by mouth in the morning on an empty stomach 90 tablet 3    metFORMIN ER (GLUCOPHAGE-XR) 500 MG 24 hr tablet Take 2 tablets by mouth every morning and every evening. 120 tablet 3    multivitamin with minerals tablet tablet Take 1 tablet by mouth Daily.      sertraline (Zoloft) 50 MG tablet Take 1 tablet by mouth Daily. 90 tablet 3    Semaglutide-Weight Management (Wegovy) 0.25 MG/0.5ML solution auto-injector Inject 0.25 mg under the skin into the appropriate area as directed 1 (One) Time Per Week. 2 mL 0     Current Facility-Administered Medications   Medication Dose Route Frequency Provider Last Rate Last Admin    methylPREDNISolone sodium succinate (SOLU-Medrol) injection 40 mg  40 mg Intramuscular Once Marybel Valle APRN           Allergies:   Allergies   Allergen Reactions    Penicillins Rash       Vitals:   /83 (BP Location: Right arm, Patient Position: Sitting, Cuff Size: Large Adult)   Pulse 77   Temp 98 °F (36.7 °C) (Temporal)   Resp 16   Ht 175.3 cm (69\")   Wt (!) 141 kg (311 lb 3.2 oz)   SpO2 97%   BMI 45.96 kg/m²     Estimated body mass index is 45.96 kg/m² as calculated from the following:    Height as of this encounter: 175.3 cm (69\").    " Weight as of this encounter: 141 kg (311 lb 3.2 oz).    Janeth Moctezuma  reports that she has never smoked. She has never used smokeless tobacco.           Physical Exam:   Physical Exam  Constitutional:       Appearance: Normal appearance.   HENT:      Mouth/Throat:      Mouth: Mucous membranes are moist.   Cardiovascular:      Rate and Rhythm: Normal rate and regular rhythm.   Pulmonary:      Effort: Pulmonary effort is normal.      Breath sounds: Normal breath sounds. No wheezing or rhonchi.   Musculoskeletal:         General: Normal range of motion.   Skin:     General: Skin is warm and dry.   Neurological:      Mental Status: She is alert.   Psychiatric:         Mood and Affect: Mood normal.        Lab Results:   Lab on 05/03/2023   Component Date Value Ref Range Status    TSH 05/03/2023 2.070  0.270 - 4.200 uIU/mL Final   Office Visit on 04/28/2023   Component Date Value Ref Range Status    Glucose 05/03/2023 95  65 - 99 mg/dL Final    BUN 05/03/2023 11  6 - 20 mg/dL Final    Creatinine 05/03/2023 0.69  0.57 - 1.00 mg/dL Final    Sodium 05/03/2023 140  136 - 145 mmol/L Final    Potassium 05/03/2023 4.2  3.5 - 5.2 mmol/L Final    Chloride 05/03/2023 100  98 - 107 mmol/L Final    CO2 05/03/2023 29.0  22.0 - 29.0 mmol/L Final    Calcium 05/03/2023 9.1  8.6 - 10.5 mg/dL Final    Total Protein 05/03/2023 7.3  6.0 - 8.5 g/dL Final    Albumin 05/03/2023 4.2  3.5 - 5.2 g/dL Final    ALT (SGPT) 05/03/2023 34 (H)  1 - 33 U/L Final    AST (SGOT) 05/03/2023 25  1 - 32 U/L Final    Alkaline Phosphatase 05/03/2023 88  39 - 117 U/L Final    Total Bilirubin 05/03/2023 0.6  0.0 - 1.2 mg/dL Final    Globulin 05/03/2023 3.1  gm/dL Final    A/G Ratio 05/03/2023 1.4  g/dL Final    BUN/Creatinine Ratio 05/03/2023 15.9  7.0 - 25.0 Final    Anion Gap 05/03/2023 11.0  5.0 - 15.0 mmol/L Final    eGFR 05/03/2023 116.2  >60.0 mL/min/1.73 Final    Hemoglobin A1C 05/03/2023 5.60  4.80 - 5.60 % Final    Total Cholesterol 05/03/2023 174  0 -  200 mg/dL Final    Triglycerides 05/03/2023 166 (H)  0 - 150 mg/dL Final    HDL Cholesterol 05/03/2023 46  40 - 60 mg/dL Final    LDL Cholesterol  05/03/2023 99  0 - 100 mg/dL Final    VLDL Cholesterol 05/03/2023 29  5 - 40 mg/dL Final    LDL/HDL Ratio 05/03/2023 2.06   Final    Vitamin B-12 05/03/2023 600  211 - 946 pg/mL Final    25 Hydroxy, Vitamin D 05/03/2023 42.0  30.0 - 100.0 ng/ml Final    WBC 05/03/2023 5.25  3.40 - 10.80 10*3/mm3 Final    RBC 05/03/2023 5.31 (H)  3.77 - 5.28 10*6/mm3 Final    Hemoglobin 05/03/2023 13.9  12.0 - 15.9 g/dL Final    Hematocrit 05/03/2023 41.3  34.0 - 46.6 % Final    MCV 05/03/2023 77.8 (L)  79.0 - 97.0 fL Final    MCH 05/03/2023 26.2 (L)  26.6 - 33.0 pg Final    MCHC 05/03/2023 33.7  31.5 - 35.7 g/dL Final    RDW 05/03/2023 14.1  12.3 - 15.4 % Final    RDW-SD 05/03/2023 39.3  37.0 - 54.0 fl Final    MPV 05/03/2023 10.7  6.0 - 12.0 fL Final    Platelets 05/03/2023 320  140 - 450 10*3/mm3 Final    Neutrophil % 05/03/2023 55.4  42.7 - 76.0 % Final    Lymphocyte % 05/03/2023 32.8  19.6 - 45.3 % Final    Monocyte % 05/03/2023 7.6  5.0 - 12.0 % Final    Eosinophil % 05/03/2023 2.7  0.3 - 6.2 % Final    Basophil % 05/03/2023 1.1  0.0 - 1.5 % Final    Immature Grans % 05/03/2023 0.4  0.0 - 0.5 % Final    Neutrophils, Absolute 05/03/2023 2.91  1.70 - 7.00 10*3/mm3 Final    Lymphocytes, Absolute 05/03/2023 1.72  0.70 - 3.10 10*3/mm3 Final    Monocytes, Absolute 05/03/2023 0.40  0.10 - 0.90 10*3/mm3 Final    Eosinophils, Absolute 05/03/2023 0.14  0.00 - 0.40 10*3/mm3 Final    Basophils, Absolute 05/03/2023 0.06  0.00 - 0.20 10*3/mm3 Final    Immature Grans, Absolute 05/03/2023 0.02  0.00 - 0.05 10*3/mm3 Final    nRBC 05/03/2023 0.0  0.0 - 0.2 /100 WBC Final       Assessment and Plan  Diagnoses and all orders for this visit:    1. Body mass index (BMI) of 45.0 to 49.9 in adult (Primary)  -     Semaglutide-Weight Management (Wegovy) 0.25 MG/0.5ML solution auto-injector; Inject 0.25 mg  under the skin into the appropriate area as directed 1 (One) Time Per Week.  Dispense: 2 mL; Refill: 0    2. Morbid obesity    3. Acquired hypothyroidism  -     levothyroxine (SYNTHROID, LEVOTHROID) 75 MCG tablet; Take 1 tablet by mouth in the morning on an empty stomach  Dispense: 90 tablet; Refill: 3    4. Major depressive disorder, single episode, moderate  -     sertraline (Zoloft) 50 MG tablet; Take 1 tablet by mouth Daily.  Dispense: 90 tablet; Refill: 3    5. Generalized anxiety disorder  -     sertraline (Zoloft) 50 MG tablet; Take 1 tablet by mouth Daily.  Dispense: 90 tablet; Refill: 3    6. PCOS (polycystic ovarian syndrome)  -     metFORMIN ER (GLUCOPHAGE-XR) 500 MG 24 hr tablet; Take 2 tablets by mouth every morning and every evening.  Dispense: 120 tablet; Refill: 3    Patient has a BMI of 46, she currently is trying to adhere to a 1800 calorie diet and moderate exercise without benefit. She has a history of MARTIN, and insomnia making stimulants like phentermine or Wellbutrin not indicated. She is currently not on any GLP-1s. She has no history of gallstones or pancreatitis. No history of thyroid cancer or medullary thyroid cancer. She was counseled on the risks and benefits of GLP-1s. She has comorbid conditions of HTN, HLD, prediabetes. I recommend starting wegovy to achieve weight loss to prevent further co morbidities.   Same as #1  Continue levothyroxine 75mcg. TSH normal in May  Experiencing tachyphylaxis will increase zoloft to 50mg, no SI or HI  Same as #5  Will benefit from weight loss therapy as above, continue metformin  Discussed she has fairly resistant HTN, no rapid weight gain or hypokalemia. No current symptoms. I screened her for sleep apnea, although is a possibility she has no witnessed apnea, and no snoring. She would not wear a CPAP, therefore I recommend weight loss and BP monitoring. Continue medications BP at goal.         New Medications:   New Medications Ordered This Visit    Medications    levothyroxine (SYNTHROID, LEVOTHROID) 75 MCG tablet     Sig: Take 1 tablet by mouth in the morning on an empty stomach     Dispense:  90 tablet     Refill:  3    metFORMIN ER (GLUCOPHAGE-XR) 500 MG 24 hr tablet     Sig: Take 2 tablets by mouth every morning and every evening.     Dispense:  120 tablet     Refill:  3    sertraline (Zoloft) 50 MG tablet     Sig: Take 1 tablet by mouth Daily.     Dispense:  90 tablet     Refill:  3    Semaglutide-Weight Management (Wegovy) 0.25 MG/0.5ML solution auto-injector     Sig: Inject 0.25 mg under the skin into the appropriate area as directed 1 (One) Time Per Week.     Dispense:  2 mL     Refill:  0       Discontinued Medications:   Medications Discontinued During This Encounter   Medication Reason    norgestimate-ethinyl estradiol (Tri-Sprintec) 0.18/0.215/0.25 MG-35 MCG per tablet *Therapy completed    azithromycin (Zithromax Z-Sam) 250 MG tablet *Therapy completed    guaiFENesin-dextromethorphan (ROBITUSSIN DM) 100-10 MG/5ML syrup *Therapy completed    predniSONE (DELTASONE) 10 MG (21) dose pack *Therapy completed    levothyroxine (SYNTHROID, LEVOTHROID) 75 MCG tablet Reorder    sertraline (Zoloft) 25 MG tablet Reorder    metFORMIN ER (GLUCOPHAGE-XR) 500 MG 24 hr tablet Reorder                         Follow Up:   No follow-ups on file.    Patient was given instructions and counseling regarding her condition or for health maintenance advice. Please see specific information pulled into the AVS if appropriate.       This document has been electronically signed by Arvin Gonzalez DO   September 27, 2023 15:18 EDT    Dictated Utilizing Dragon Dictation: Part of this note may be an electronic transcription/translation of spoken language to printed text using the Dragon Dictation System.

## 2023-10-23 DIAGNOSIS — E66.01 MORBID OBESITY: ICD-10-CM

## 2023-10-23 RX ORDER — SEMAGLUTIDE 0.5 MG/.5ML
0.5 INJECTION, SOLUTION SUBCUTANEOUS WEEKLY
Qty: 2 ML | Refills: 0 | Status: SHIPPED | OUTPATIENT
Start: 2023-10-23 | End: 2023-11-20

## 2023-11-13 ENCOUNTER — FLU SHOT (OUTPATIENT)
Dept: FAMILY MEDICINE CLINIC | Facility: CLINIC | Age: 38
End: 2023-11-13
Payer: COMMERCIAL

## 2023-11-13 DIAGNOSIS — Z23 IMMUNIZATION DUE: Primary | ICD-10-CM

## 2023-11-20 DIAGNOSIS — E66.01 MORBID OBESITY: ICD-10-CM

## 2023-11-20 RX ORDER — SEMAGLUTIDE 1 MG/.5ML
1 INJECTION, SOLUTION SUBCUTANEOUS WEEKLY
Qty: 2 ML | Refills: 0 | Status: SHIPPED | OUTPATIENT
Start: 2023-11-20 | End: 2023-12-18

## 2023-12-18 ENCOUNTER — DOCUMENTATION (OUTPATIENT)
Dept: FAMILY MEDICINE CLINIC | Facility: CLINIC | Age: 38
End: 2023-12-18
Payer: COMMERCIAL

## 2023-12-18 DIAGNOSIS — E66.01 MORBID OBESITY: Primary | ICD-10-CM

## 2023-12-20 NOTE — TELEPHONE ENCOUNTER
Original order authorized by provider was put in as clinic administered instead of prescription to pharmacy...  Sent to appropriate pharmacy.

## 2024-01-03 ENCOUNTER — TELEPHONE (OUTPATIENT)
Dept: CARDIOLOGY | Facility: CLINIC | Age: 39
End: 2024-01-03
Payer: COMMERCIAL

## 2024-01-03 RX ORDER — HYDROCHLOROTHIAZIDE 25 MG/1
25 TABLET ORAL DAILY
Qty: 90 TABLET | Refills: 2 | Status: SHIPPED | OUTPATIENT
Start: 2024-01-03 | End: 2024-01-04 | Stop reason: SDUPTHER

## 2024-01-03 RX ORDER — CARVEDILOL 25 MG/1
25 TABLET ORAL 2 TIMES DAILY
Qty: 180 TABLET | Refills: 3 | Status: SHIPPED | OUTPATIENT
Start: 2024-01-03 | End: 2024-01-04 | Stop reason: SDUPTHER

## 2024-01-03 RX ORDER — AMLODIPINE BESYLATE 10 MG/1
10 TABLET ORAL DAILY
Qty: 90 TABLET | Refills: 2 | Status: SHIPPED | OUTPATIENT
Start: 2024-01-03 | End: 2024-01-04 | Stop reason: SDUPTHER

## 2024-01-03 NOTE — TELEPHONE ENCOUNTER
----- Message from Eulogio Mercado sent at 1/3/2024  3:32 PM EST -----  Regarding: REFILLS  Patient was doing ok and was able to bump appt out. Could we please send refills to [facility]- Carvedilol, Amlodipine, Hydrochlorothiazide.

## 2024-01-04 ENCOUNTER — TELEPHONE (OUTPATIENT)
Dept: CARDIOLOGY | Facility: CLINIC | Age: 39
End: 2024-01-04
Payer: COMMERCIAL

## 2024-01-04 RX ORDER — CARVEDILOL 25 MG/1
25 TABLET ORAL 2 TIMES DAILY
Qty: 180 TABLET | Refills: 3 | Status: SHIPPED | OUTPATIENT
Start: 2024-01-04

## 2024-01-04 RX ORDER — HYDROCHLOROTHIAZIDE 25 MG/1
25 TABLET ORAL DAILY
Qty: 90 TABLET | Refills: 2 | Status: SHIPPED | OUTPATIENT
Start: 2024-01-04

## 2024-01-04 RX ORDER — AMLODIPINE BESYLATE 10 MG/1
10 TABLET ORAL DAILY
Qty: 90 TABLET | Refills: 2 | Status: SHIPPED | OUTPATIENT
Start: 2024-01-04

## 2024-01-04 NOTE — TELEPHONE ENCOUNTER
----- Message from Eulogio Mercado sent at 1/3/2024  3:32 PM EST -----  Regarding: REFILLS  Patient was doing ok and was able to bump appt out. Could we please send refills to [facility]- Carvedilol, Amlodipine, Hydrochlorothiazide.

## 2024-02-15 DIAGNOSIS — E66.01 MORBID OBESITY: ICD-10-CM

## 2024-02-15 RX ORDER — SEMAGLUTIDE 2.4 MG/.75ML
2.4 INJECTION, SOLUTION SUBCUTANEOUS WEEKLY
Qty: 3 ML | Refills: 3 | Status: SHIPPED | OUTPATIENT
Start: 2024-02-15

## 2024-03-19 DIAGNOSIS — R73.03 PREDIABETES: ICD-10-CM

## 2024-03-19 DIAGNOSIS — E53.8 VITAMIN B12 DEFICIENCY: ICD-10-CM

## 2024-03-19 DIAGNOSIS — R53.83 OTHER FATIGUE: Primary | ICD-10-CM

## 2024-03-19 DIAGNOSIS — E78.5 DYSLIPIDEMIA: ICD-10-CM

## 2024-03-19 PROCEDURE — 80061 LIPID PANEL: CPT | Performed by: STUDENT IN AN ORGANIZED HEALTH CARE EDUCATION/TRAINING PROGRAM

## 2024-03-19 PROCEDURE — 80050 GENERAL HEALTH PANEL: CPT | Performed by: STUDENT IN AN ORGANIZED HEALTH CARE EDUCATION/TRAINING PROGRAM

## 2024-03-19 PROCEDURE — 83036 HEMOGLOBIN GLYCOSYLATED A1C: CPT | Performed by: STUDENT IN AN ORGANIZED HEALTH CARE EDUCATION/TRAINING PROGRAM

## 2024-03-19 PROCEDURE — 82607 VITAMIN B-12: CPT | Performed by: STUDENT IN AN ORGANIZED HEALTH CARE EDUCATION/TRAINING PROGRAM

## 2024-03-21 DIAGNOSIS — G43.009 MIGRAINE WITHOUT AURA AND WITHOUT STATUS MIGRAINOSUS, NOT INTRACTABLE: Primary | ICD-10-CM

## 2024-04-17 ENCOUNTER — TRANSCRIBE ORDERS (OUTPATIENT)
Dept: ADMINISTRATIVE | Facility: HOSPITAL | Age: 39
End: 2024-04-17
Payer: COMMERCIAL

## 2024-04-17 ENCOUNTER — OFFICE VISIT (OUTPATIENT)
Dept: FAMILY MEDICINE CLINIC | Facility: CLINIC | Age: 39
End: 2024-04-17
Payer: COMMERCIAL

## 2024-04-17 VITALS
BODY MASS INDEX: 41.47 KG/M2 | OXYGEN SATURATION: 97 % | DIASTOLIC BLOOD PRESSURE: 82 MMHG | WEIGHT: 280 LBS | HEIGHT: 69 IN | TEMPERATURE: 98 F | HEART RATE: 71 BPM | SYSTOLIC BLOOD PRESSURE: 140 MMHG | RESPIRATION RATE: 16 BRPM

## 2024-04-17 DIAGNOSIS — M25.561 PAIN OF MENISCUS OF RIGHT KNEE: Primary | ICD-10-CM

## 2024-04-17 DIAGNOSIS — Z12.31 VISIT FOR SCREENING MAMMOGRAM: Primary | ICD-10-CM

## 2024-04-17 PROCEDURE — 99213 OFFICE O/P EST LOW 20 MIN: CPT | Performed by: STUDENT IN AN ORGANIZED HEALTH CARE EDUCATION/TRAINING PROGRAM

## 2024-04-17 RX ORDER — LANOLIN ALCOHOL/MO/W.PET/CERES
400 CREAM (GRAM) TOPICAL DAILY
COMMUNITY

## 2024-04-17 NOTE — PROGRESS NOTES
Chief Complaint  Knee Pain (Right knee)    HPI:   Knee Pain        Janeth Moctezuma is a 38 y.o. female who presents today to [facility] for Knee Pain (Right knee). Patient has a past medical history of meniscal injury, MCL tear, and patellar dislocation. She had surgery for meniscal and ligament repair in . She had post operative physical therapy. She has had chronic knee pain for many years, but has recently worsened over the past 2-3 months. She has tried NSAIDs, Ice, and at home exercises without relief. She has had an intraarticular joint injection without relief.     Previous History:   Past Medical History:   Diagnosis Date    Anxiety     Hypertension     Hypothyroidism     Low back pain     Migraines     Other specified disorders involving the immune mechanism, not elsewhere classified     JORGE A-ROMBERG SYNDROME    Polycystic disease, ovaries     PCOS    Prediabetes       Past Surgical History:   Procedure Laterality Date     SECTION  2021    [facility], DR. STOVALL    FACIAL RECONSTRUCTION SURGERY  2017    KNEE SURGERY        Social History     Socioeconomic History    Marital status:    Tobacco Use    Smoking status: Never    Smokeless tobacco: Never   Vaping Use    Vaping status: Never Used   Substance and Sexual Activity    Alcohol use: Not Currently     Comment: occasional     Drug use: No    Sexual activity: Yes     Partners: Male     Birth control/protection: I.U.D.      Health Maintenance Due   Topic Date Due    TDAP/TD VACCINES (1 - Tdap) Never done    HEPATITIS C SCREENING  Never done    ANNUAL PHYSICAL  Never done    COVID-19 Vaccine (3 - 2023- season) 2023        Current Medications:  Current Outpatient Medications   Medication Sig Dispense Refill    amLODIPine (NORVASC) 10 MG tablet Take 1 tablet by mouth Daily. 90 tablet 2    aspirin 81 MG EC tablet Take 1 tablet by mouth Daily.      carvedilol (COREG) 25 MG tablet Take 1  "tablet by mouth 2 (Two) Times a Day. 180 tablet 3    cetirizine (zyrTEC) 10 MG tablet Take 1 tablet by mouth Daily. 90 tablet 3    cloNIDine (CATAPRES) 0.1 MG tablet Take 1 tablet by mouth 3 (Three) Times a Day As Needed for High Blood Pressure (> 160/90). 30 tablet 1    diclofenac (CATAFLAM) 50 MG tablet Take 1 tablet by mouth 2 (Two) Times a Day As Needed (back pain). 60 tablet 0    folic acid (FOLVITE) 400 MCG tablet Take 1 tablet by mouth Daily.      hydroCHLOROthiazide 25 MG tablet Take 1 tablet by mouth Daily. 90 tablet 2    hydrOXYzine (ATARAX) 25 MG tablet Take 1 tablet by mouth 3 (Three) Times a Day As Needed for Anxiety. 90 tablet 0    L-Lysine 1000 MG tablet Take 1 tablet by mouth Daily.      levothyroxine (SYNTHROID, LEVOTHROID) 75 MCG tablet Take 1 tablet by mouth in the morning on an empty stomach 90 tablet 3    metFORMIN ER (GLUCOPHAGE-XR) 500 MG 24 hr tablet Take 2 tablets by mouth every morning and every evening. (Patient taking differently: Take 2 tablets by mouth 2 (Two) Times a Day. 2 tablets in the AM and 1 tablet in the PM) 120 tablet 3    multivitamin with minerals tablet tablet Take 1 tablet by mouth Daily.      Semaglutide-Weight Management (Wegovy) 2.4 MG/0.75ML solution auto-injector Inject 2.4 mg under the skin into the appropriate area as directed 1 (One) Time Per Week. 3 mL 3    sertraline (Zoloft) 50 MG tablet Take 1 tablet by mouth Daily. 90 tablet 3     No current facility-administered medications for this visit.       Allergies:   Allergies   Allergen Reactions    Penicillins Rash       Vitals:   /82 (BP Location: Right arm, Patient Position: Sitting, Cuff Size: Large Adult)   Pulse 71   Temp 98 °F (36.7 °C) (Temporal)   Resp 16   Ht 175.3 cm (69.02\")   Wt 127 kg (280 lb)   SpO2 97%   BMI 41.33 kg/m²     Estimated body mass index is 41.33 kg/m² as calculated from the following:    Height as of this encounter: 175.3 cm (69.02\").    Weight as of this encounter: 127 kg " (280 lb).    Janeth Moctezuma  reports that she has never smoked. She has never used smokeless tobacco.            Physical Exam:   Physical Exam  Constitutional:       Appearance: Normal appearance.   HENT:      Mouth/Throat:      Mouth: Mucous membranes are moist.   Cardiovascular:      Rate and Rhythm: Normal rate and regular rhythm.   Pulmonary:      Effort: Pulmonary effort is normal.      Breath sounds: Normal breath sounds. No wheezing or rhonchi.   Musculoskeletal:         General: Normal range of motion.      Left knee: Crepitus present. No swelling, deformity, effusion or bony tenderness. Normal range of motion. Tenderness present over the medial joint line and patellar tendon. No ACL laxity.     Instability Tests: Anterior Lachman test negative. Medial Alan test positive.      Comments: Patellar distraction test is positive, significant patellar laxity.    Skin:     General: Skin is warm and dry.   Neurological:      Mental Status: She is alert.   Psychiatric:         Mood and Affect: Mood normal.          Lab Results:   Lab on 03/19/2024   Component Date Value Ref Range Status    WBC 03/19/2024 6.57  3.40 - 10.80 10*3/mm3 Final    RBC 03/19/2024 5.54 (H)  3.77 - 5.28 10*6/mm3 Final    Hemoglobin 03/19/2024 14.7  12.0 - 15.9 g/dL Final    Hematocrit 03/19/2024 44.7  34.0 - 46.6 % Final    MCV 03/19/2024 80.7  79.0 - 97.0 fL Final    MCH 03/19/2024 26.5 (L)  26.6 - 33.0 pg Final    MCHC 03/19/2024 32.9  31.5 - 35.7 g/dL Final    RDW 03/19/2024 14.1  12.3 - 15.4 % Final    RDW-SD 03/19/2024 41.5  37.0 - 54.0 fl Final    MPV 03/19/2024 10.6  6.0 - 12.0 fL Final    Platelets 03/19/2024 349  140 - 450 10*3/mm3 Final    Glucose 03/19/2024 74  65 - 99 mg/dL Final    BUN 03/19/2024 10  6 - 20 mg/dL Final    Creatinine 03/19/2024 0.74  0.57 - 1.00 mg/dL Final    Sodium 03/19/2024 143  136 - 145 mmol/L Final    Potassium 03/19/2024 3.7  3.5 - 5.2 mmol/L Final    Chloride 03/19/2024 103  98 - 107 mmol/L Final     CO2 03/19/2024 28.6  22.0 - 29.0 mmol/L Final    Calcium 03/19/2024 9.3  8.6 - 10.5 mg/dL Final    Total Protein 03/19/2024 7.2  6.0 - 8.5 g/dL Final    Albumin 03/19/2024 4.2  3.5 - 5.2 g/dL Final    ALT (SGPT) 03/19/2024 21  1 - 33 U/L Final    AST (SGOT) 03/19/2024 19  1 - 32 U/L Final    Alkaline Phosphatase 03/19/2024 111  39 - 117 U/L Final    Total Bilirubin 03/19/2024 0.7  0.0 - 1.2 mg/dL Final    Globulin 03/19/2024 3.0  gm/dL Final    A/G Ratio 03/19/2024 1.4  g/dL Final    BUN/Creatinine Ratio 03/19/2024 13.5  7.0 - 25.0 Final    Anion Gap 03/19/2024 11.4  5.0 - 15.0 mmol/L Final    eGFR 03/19/2024 107.7  >60.0 mL/min/1.73 Final    TSH 03/19/2024 1.490  0.270 - 4.200 uIU/mL Final    Hemoglobin A1C 03/19/2024 5.60  4.80 - 5.60 % Final    Vitamin B-12 03/19/2024 620  211 - 946 pg/mL Final    Total Cholesterol 03/19/2024 187  0 - 200 mg/dL Final    Triglycerides 03/19/2024 168 (H)  0 - 150 mg/dL Final    HDL Cholesterol 03/19/2024 37 (L)  40 - 60 mg/dL Final    LDL Cholesterol  03/19/2024 120 (H)  0 - 100 mg/dL Final    VLDL Cholesterol 03/19/2024 30  5 - 40 mg/dL Final    LDL/HDL Ratio 03/19/2024 3.15   Final       Assessment and Plan  Diagnoses and all orders for this visit:    1. Pain of meniscus of right knee (Primary)  -     XR Knee 3 View Right  -     MRI Knee Right Without Contrast; Future    Patient has significant knee pain that has been persistent for 3 months. She has failed, NSAIDs, ice, joint injection, and home exercises. Patient has had previous knee surgery. Medial joint line is very tender. Medial Alan is positive. Standing x-rays were negative. Right knee X-ray today is normal. I am concerned for meniscal injury. Patients patella is also very lax. She has a history of patellar dislocation. Given the abnormal anatomy, and post surgical knee, with positive Alan, I recommend MRI prior to initiation of physical therapy. Will determine best course of action based on MRI results.              New Medications:   No orders of the defined types were placed in this encounter.      Discontinued Medications:   Medications Discontinued During This Encounter   Medication Reason    methylPREDNISolone sodium succinate (SOLU-Medrol) injection 40 mg *Therapy completed                 Follow Up:   No follow-ups on file.    Patient was given instructions and counseling regarding her condition or for health maintenance advice. Please see specific information pulled into the AVS if appropriate.       This document has been electronically signed by Arvin Gonzalez DO   April 17, 2024 11:15 EDT    Dictated Utilizing Dragon Dictation: Part of this note may be an electronic transcription/translation of spoken language to printed text using the Dragon Dictation System.

## 2024-05-02 ENCOUNTER — HOSPITAL ENCOUNTER (OUTPATIENT)
Dept: MRI IMAGING | Facility: HOSPITAL | Age: 39
Discharge: HOME | End: 2024-05-02
Admitting: STUDENT IN AN ORGANIZED HEALTH CARE EDUCATION/TRAINING PROGRAM
Payer: COMMERCIAL

## 2024-05-02 DIAGNOSIS — M25.561 PAIN OF MENISCUS OF RIGHT KNEE: ICD-10-CM

## 2024-05-02 PROCEDURE — 73721 MRI JNT OF LWR EXTRE W/O DYE: CPT | Performed by: RADIOLOGY

## 2024-05-02 PROCEDURE — 73721 MRI JNT OF LWR EXTRE W/O DYE: CPT

## 2024-05-10 DIAGNOSIS — S83.251A BUCKET-HANDLE TEAR OF LATERAL MENISCUS OF RIGHT KNEE AS CURRENT INJURY, INITIAL ENCOUNTER: Primary | ICD-10-CM

## 2024-05-15 ENCOUNTER — OFFICE VISIT (OUTPATIENT)
Dept: ORTHOPEDIC SURGERY | Facility: CLINIC | Age: 39
End: 2024-05-15
Payer: COMMERCIAL

## 2024-05-15 VITALS
HEIGHT: 69 IN | WEIGHT: 272 LBS | DIASTOLIC BLOOD PRESSURE: 86 MMHG | SYSTOLIC BLOOD PRESSURE: 128 MMHG | BODY MASS INDEX: 40.29 KG/M2

## 2024-05-15 DIAGNOSIS — S83.271A COMPLEX TEAR OF LATERAL MENISCUS OF RIGHT KNEE AS CURRENT INJURY, INITIAL ENCOUNTER: Primary | ICD-10-CM

## 2024-05-15 DIAGNOSIS — E66.01 OBESITY, MORBID, BMI 40.0-49.9: ICD-10-CM

## 2024-05-15 NOTE — PROGRESS NOTES
[facility] Note        Subjective     Pain of the Right Knee      HPI    Janeth Moctezuma is a 38 y.o. female.  She has a 2-year history of right knee pain.  Progressively getting worse.  She works as a medical assistant at [employer].  She is working full duty.  She had a steroid injection in March that did not help.  She has had NSAIDs.  She has had a home exercise program.  She has surgery in  to the same knee and did well until recently.  She is working on weight loss.  She has lost 30 pounds since October.    Past Medical History:   Diagnosis Date    Ankle sprain     Anxiety     Arthritis of back     Arthritis of neck     Cervical disc disorder     Fracture, fibula     Fracture, foot     Fracture, radius     Fracture, tibia and fibula     Fracture, ulna     Hypertension     Hypothyroidism     Knee swelling     Low back pain     Low back strain     Lumbosacral disc disease     Migraines     Other specified disorders involving the immune mechanism, not elsewhere classified     JORGE A-ROMBERG SYNDROME    Polycystic disease, ovaries     PCOS    Prediabetes     Subluxation of patella     Tear of meniscus of knee     Tendinitis of knee     Thoracic disc disorder       Past Surgical History:   Procedure Laterality Date     SECTION  2021    [facility], DR. STOVALL    FACIAL RECONSTRUCTION SURGERY  2017    KNEE SURGERY Right     Medial meniscus, repair ligaments and tendons (all except ACL)      Family History   Problem Relation Age of Onset    JUNAID disease Mother     Hearing loss Father     Thyroid disease Father     Early death Father     Diabetes Father     Depression Father     Anxiety disorder Father     COPD Father     Hypertension Father     Hyperlipidemia Father     Heart disease Father     Heart attack Father     Anemia Father     Broken bones Father         Ankle    Hypertension Brother     JUNAID disease Brother     Broken bones Brother         Growth  plates in wrists    No Known Problems Son     Diabetes Maternal Uncle     Hyperlipidemia Paternal Aunt     Heart disease Paternal Aunt     Hypertension Paternal Aunt     Thyroid disease Paternal Aunt     Anxiety disorder Paternal Aunt     Mental illness Maternal Grandfather     Cancer Maternal Grandfather         Prostate    Heart attack Paternal Grandmother     Vision loss Paternal Grandmother     Stroke Paternal Grandmother     Hypertension Paternal Grandmother     Hyperlipidemia Paternal Grandmother     Heart disease Paternal Grandmother     Hearing loss Paternal Grandmother     COPD Paternal Grandmother     Osteoporosis Paternal Grandmother     Scoliosis Paternal Grandmother     Early death Paternal Grandfather     Other Paternal Grandfather         BLACK LUNG     Social History     Socioeconomic History    Marital status:    Tobacco Use    Smoking status: Never    Smokeless tobacco: Never   Vaping Use    Vaping status: Never Used   Substance and Sexual Activity    Alcohol use: Not Currently     Comment: Socially    Drug use: Never    Sexual activity: Yes     Partners: Male     Birth control/protection: I.U.D.     Comment: Jacki since June 2023      Current Outpatient Medications on File Prior to Visit   Medication Sig Dispense Refill    amLODIPine (NORVASC) 10 MG tablet Take 1 tablet by mouth Daily. 90 tablet 2    aspirin 81 MG EC tablet Take 1 tablet by mouth Daily.      carvedilol (COREG) 25 MG tablet Take 1 tablet by mouth 2 (Two) Times a Day. 180 tablet 3    cetirizine (zyrTEC) 10 MG tablet Take 1 tablet by mouth Daily. 90 tablet 3    cloNIDine (CATAPRES) 0.1 MG tablet Take 1 tablet by mouth 3 (Three) Times a Day As Needed for High Blood Pressure (> 160/90). 30 tablet 1    diclofenac (CATAFLAM) 50 MG tablet Take 1 tablet by mouth 2 (Two) Times a Day As Needed (back pain). 60 tablet 0    folic acid (FOLVITE) 400 MCG tablet Take 1 tablet by mouth Daily.      hydroCHLOROthiazide 25 MG tablet Take 1  "tablet by mouth Daily. 90 tablet 2    hydrOXYzine (ATARAX) 25 MG tablet Take 1 tablet by mouth 3 (Three) Times a Day As Needed for Anxiety. 90 tablet 0    L-Lysine 1000 MG tablet Take 1 tablet by mouth Daily.      levothyroxine (SYNTHROID, LEVOTHROID) 75 MCG tablet Take 1 tablet by mouth in the morning on an empty stomach 90 tablet 3    metFORMIN ER (GLUCOPHAGE-XR) 500 MG 24 hr tablet Take 2 tablets by mouth every morning and every evening. (Patient taking differently: Take 2 tablets by mouth 2 (Two) Times a Day. 2 tablets in the AM and 1 tablet in the PM) 120 tablet 3    multivitamin with minerals tablet tablet Take 1 tablet by mouth Daily.      Semaglutide-Weight Management (Wegovy) 2.4 MG/0.75ML solution auto-injector Inject 2.4 mg under the skin into the appropriate area as directed 1 (One) Time Per Week. 3 mL 3    sertraline (Zoloft) 50 MG tablet Take 1 tablet by mouth Daily. 90 tablet 3     No current facility-administered medications on file prior to visit.      Allergies   Allergen Reactions    Penicillins Rash          Review of Systems   Constitutional: Negative.    HENT: Negative.     Eyes: Negative.    Respiratory: Negative.     Cardiovascular: Negative.    Gastrointestinal: Negative.    Endocrine: Negative.    Genitourinary: Negative.    Musculoskeletal:  Positive for arthralgias.   Skin: Negative.    Allergic/Immunologic: Negative.    Neurological: Negative.    Hematological: Negative.    Psychiatric/Behavioral: Negative.          I reviewed the patient's chief complaint, history of present illness, review of systems, past medical history, surgical history, family history, social history, medications and allergy list.        Objective      Physical Exam  /86   Ht 175.3 cm (69.02\")   Wt 123 kg (272 lb)   BMI 40.15 kg/m²     Body mass index is 40.15 kg/m².    General  Mental Status - alert  General Appearance - cooperative, well groomed, not in acute distress  Orientation - Oriented X3  Build & " Nutrition - well developed and well nourished  Posture - normal posture  Gait - normal gait       Ortho Exam  She is tender in the anterior lateral joint line.  Stable ligaments.  Full range of motion.    Imaging/Studies Reviewed and Interpreted:    Her x-rays from April 17 of the right knee were negative  I viewed and personally interpreted her right knee MRI from May 2 as a large tear to the anterior horn of the lateral meniscus.  No other pathology.    Assessment    Assessment:  1. Complex tear of lateral meniscus of right knee as current injury, initial encounter    2. Obesity, morbid, BMI 40.0-49.9        Plan:  The plan will be for right knee arthroscopy with partial lateral meniscectomy.  She has failed 2 years of conservative management.  She had a cortisone injection and home exercise program.  She would like to get her knee fixed.  She understands she may still have some arthritis in her knee and she will work on weight loss.Treatment options and alternatives were discussed with patient and family.  Surgical risks include but are not limited to pain, bleeding, infection, failure to relieve symptoms, need for further procedures, recurrence of symptoms, damage to healthy adjacent structures, hardware loosening/failure, stiffness, weakness, scar, blood clots/DVT/PE, loss of limb or life. We also discussed the postoperative protocol and expected outcome although no guarantees are possible with surgery. All questions were answered; the patient would like to proceed with surgical intervention.        Deonte Paz MD  05/15/24  15:04 EDT      Dictated Utilizing Dragon Dictation.

## 2024-06-03 DIAGNOSIS — E28.2 PCOS (POLYCYSTIC OVARIAN SYNDROME): ICD-10-CM

## 2024-06-03 DIAGNOSIS — E66.01 MORBID OBESITY: ICD-10-CM

## 2024-06-03 RX ORDER — METFORMIN HYDROCHLORIDE 500 MG/1
TABLET, EXTENDED RELEASE ORAL
Qty: 120 TABLET | Refills: 3 | Status: SHIPPED | OUTPATIENT
Start: 2024-06-03

## 2024-06-03 RX ORDER — SEMAGLUTIDE 2.4 MG/.75ML
2.4 INJECTION, SOLUTION SUBCUTANEOUS WEEKLY
Qty: 9 ML | Refills: 1 | Status: SHIPPED | OUTPATIENT
Start: 2024-06-03 | End: 2024-09-01

## 2024-06-06 ENCOUNTER — OUTSIDE FACILITY SERVICE (OUTPATIENT)
Dept: ORTHOPEDIC SURGERY | Facility: CLINIC | Age: 39
End: 2024-06-06
Payer: COMMERCIAL

## 2024-06-06 DIAGNOSIS — Z98.890 S/P LEFT KNEE ARTHROSCOPY: Primary | ICD-10-CM

## 2024-06-06 RX ORDER — HYDROCODONE BITARTRATE AND ACETAMINOPHEN 5; 325 MG/1; MG/1
1 TABLET ORAL EVERY 6 HOURS PRN
Qty: 12 TABLET | Refills: 0 | Status: SHIPPED | OUTPATIENT
Start: 2024-06-06 | End: 2024-06-06 | Stop reason: SDUPTHER

## 2024-06-06 RX ORDER — ONDANSETRON 4 MG/1
4 TABLET, FILM COATED ORAL EVERY 8 HOURS PRN
Qty: 10 TABLET | Refills: 1 | Status: SHIPPED | OUTPATIENT
Start: 2024-06-06 | End: 2024-06-06 | Stop reason: SDUPTHER

## 2024-06-06 RX ORDER — HYDROCODONE BITARTRATE AND ACETAMINOPHEN 5; 325 MG/1; MG/1
1 TABLET ORAL EVERY 6 HOURS PRN
Qty: 12 TABLET | Refills: 0 | Status: SHIPPED | OUTPATIENT
Start: 2024-06-06

## 2024-06-06 RX ORDER — ONDANSETRON 4 MG/1
4 TABLET, FILM COATED ORAL EVERY 8 HOURS PRN
Qty: 10 TABLET | Refills: 1 | Status: SHIPPED | OUTPATIENT
Start: 2024-06-06

## 2024-06-12 ENCOUNTER — OFFICE VISIT (OUTPATIENT)
Dept: ORTHOPEDIC SURGERY | Facility: CLINIC | Age: 39
End: 2024-06-12
Payer: COMMERCIAL

## 2024-06-12 VITALS — TEMPERATURE: 97.2 F

## 2024-06-12 DIAGNOSIS — Z98.890 S/P LEFT KNEE ARTHROSCOPY: Primary | ICD-10-CM

## 2024-06-12 DIAGNOSIS — Z98.890 S/P RIGHT KNEE ARTHROSCOPY: ICD-10-CM

## 2024-06-12 PROCEDURE — 99024 POSTOP FOLLOW-UP VISIT: CPT | Performed by: ORTHOPAEDIC SURGERY

## 2024-06-12 NOTE — PROGRESS NOTES
Chief Complaint   Patient presents with    Post-op     1 week follow up -- 1 week S/P right knee arthroscopy(DOS 06/06/2024)           HPI  She is follow-up right knee scope.  No new complaints.      Vitals:    06/12/24 1441   Temp: 97.2 °F (36.2 °C)         Physical Exam:    Right knee lateral portal is draining a little bit of blood.  No surrounding erythema.  Full range of motion.  Normal gait.        ICD-10-CM ICD-9-CM   1. S/P left knee arthroscopy  Z98.890 V45.89   2. S/P right knee arthroscopy  Z98.890 V45.89       Orders Placed This Encounter   Procedures    Ambulatory Referral to Physical Therapy for Evaluation & Treatment       She will do physical therapy in [place].  She is working full duty.  She will follow-up in 3 weeks.      Deonte Paz M.D., [facility]  Orthopedic Surgeon  Fellowship Trained Sports Medicine  [facility]  Orthopedics and Sports Medicine  [address] [place] [address], [address]  [place] [address]      EMR Dragon/Transcription disclaimer:  Much of this encounter note is an electronic transcription of spoken language to printed text. Electronic transcription of spoken language may permit erroneous, or at times, nonsensical words or phrases to be inadvertently transcribed. Although I have reviewed the note for such errors, some may still exist.  Answers submitted by the patient for this visit:  Other (Submitted on 6/10/2024)  Please describe your symptoms.: Surgical Follow Up  Have you had these symptoms before?: No  How long have you been having these symptoms?: 5-7 days  Please list any medications you are currently taking for this condition.: Ibuprofen  Please describe any probable cause for these symptoms. : Right knee surgical follow up  Primary Reason for Visit (Submitted on 6/10/2024)  What is the primary reason for your visit?: Other

## 2024-07-01 ENCOUNTER — OFFICE VISIT (OUTPATIENT)
Dept: ORTHOPEDIC SURGERY | Facility: CLINIC | Age: 39
End: 2024-07-01
Payer: COMMERCIAL

## 2024-07-01 DIAGNOSIS — Z98.890 S/P LEFT KNEE ARTHROSCOPY: Primary | ICD-10-CM

## 2024-07-01 DIAGNOSIS — Z98.890 S/P RIGHT KNEE ARTHROSCOPY: ICD-10-CM

## 2024-07-01 PROCEDURE — 99024 POSTOP FOLLOW-UP VISIT: CPT | Performed by: ORTHOPAEDIC SURGERY

## 2024-07-01 NOTE — PROGRESS NOTES
Chief Complaint   Patient presents with    Post-op     3 week postop; 3 week S/P right knee arthroscopy(DOS 06/06/2024)           HPI  She is doing well.  No new complaints.  She has not yet started physical therapy because of work.      There were no vitals filed for this visit.      Physical Exam:  Right knee looks good.  Full motion full-strength.  Negative Homans' sign      X-RAY REPORT:  Imaging Results (Last 7 Days)       ** No results found for the last 168 hours. **                  ICD-10-CM ICD-9-CM   1. S/P left knee arthroscopy  Z98.890 V45.89   2. S/P right knee arthroscopy  Z98.890 V45.89       Orders Placed This Encounter   Procedures    Ambulatory Referral to Physical Therapy for Evaluation & Treatment     She will do physical therapy at [place].  She may try different location.  She will follow-up as needed.        Deonte Paz M.D., FAAOS  Orthopedic Surgeon  Fellowship Trained Sports Medicine  [facility]  Orthopedics and Sports Medicine  [address] [place] [address], [address]  [place] [address]      EMR Dragon/Transcription disclaimer:  Much of this encounter note is an electronic transcription of spoken language to printed text. Electronic transcription of spoken language may permit erroneous, or at times, nonsensical words or phrases to be inadvertently transcribed. Although I have reviewed the note for such errors, some may still exist.

## 2024-08-07 DIAGNOSIS — J30.2 SEASONAL ALLERGIES: Primary | ICD-10-CM

## 2024-08-07 RX ORDER — MONTELUKAST SODIUM 10 MG/1
10 TABLET ORAL NIGHTLY
Qty: 30 TABLET | Refills: 2 | Status: SHIPPED | OUTPATIENT
Start: 2024-08-07

## 2024-08-08 ENCOUNTER — OFFICE VISIT (OUTPATIENT)
Dept: CARDIOLOGY | Facility: CLINIC | Age: 39
End: 2024-08-08
Payer: COMMERCIAL

## 2024-08-08 VITALS
BODY MASS INDEX: 39.84 KG/M2 | WEIGHT: 269 LBS | HEART RATE: 75 BPM | SYSTOLIC BLOOD PRESSURE: 134 MMHG | HEIGHT: 69 IN | OXYGEN SATURATION: 99 % | DIASTOLIC BLOOD PRESSURE: 94 MMHG

## 2024-08-08 DIAGNOSIS — I10 PRIMARY HYPERTENSION: ICD-10-CM

## 2024-08-08 DIAGNOSIS — R06.09 DYSPNEA ON EXERTION: Primary | ICD-10-CM

## 2024-08-08 DIAGNOSIS — R53.83 OTHER FATIGUE: ICD-10-CM

## 2024-08-08 PROCEDURE — 99213 OFFICE O/P EST LOW 20 MIN: CPT | Performed by: PHYSICIAN ASSISTANT

## 2024-08-08 PROCEDURE — 93000 ELECTROCARDIOGRAM COMPLETE: CPT | Performed by: PHYSICIAN ASSISTANT

## 2024-08-08 NOTE — PROGRESS NOTES
Problem list     Subjective   Janeth Moctezuma is a 38 y.o. female     Chief Complaint   Patient presents with    Essential hypertension     Follow up - Patient states she is doing good. Patient denies any chest pains, palpitations, or shortness of breath. Patient states that she does have swelling in her ankles and hands but states its nothing abnormal.        HPI  The patient presents in clinic today for routine evaluation and follow-up.  The patient has been seen historically because of hypertension and his symptoms in the past.  We did evaluated with a stress and an echo in the past.  Though studies have been unremarkable.  We have followed with her long-term since.  She is now doing well.  She denies chest pain.  She reports stable dyspnea.  She has no failure nor dysrhythmic symptoms.  She is hypertensive today but typically normotensive when monitored at home.  She has no further complaints at this time.    Current Outpatient Medications on File Prior to Visit   Medication Sig Dispense Refill    amLODIPine (NORVASC) 10 MG tablet Take 1 tablet by mouth Daily. 90 tablet 2    aspirin 81 MG EC tablet Take 1 tablet by mouth Daily.      carvedilol (COREG) 25 MG tablet Take 1 tablet by mouth 2 (Two) Times a Day. 180 tablet 3    cetirizine (zyrTEC) 10 MG tablet Take 1 tablet by mouth Daily. 90 tablet 3    cloNIDine (CATAPRES) 0.1 MG tablet Take 1 tablet by mouth 3 (Three) Times a Day As Needed for High Blood Pressure (> 160/90). 30 tablet 1    diclofenac (CATAFLAM) 50 MG tablet Take 1 tablet by mouth 2 (Two) Times a Day As Needed (back pain). 60 tablet 0    folic acid (FOLVITE) 400 MCG tablet Take 1 tablet by mouth Daily.      hydroCHLOROthiazide 25 MG tablet Take 1 tablet by mouth Daily. 90 tablet 2    hydrOXYzine (ATARAX) 25 MG tablet Take 1 tablet by mouth 3 (Three) Times a Day As Needed for Anxiety. 90 tablet 0    L-Lysine 1000 MG tablet Take 1 tablet by mouth Daily.      levothyroxine (SYNTHROID, LEVOTHROID) 75 MCG  tablet Take 1 tablet by mouth in the morning on an empty stomach 90 tablet 3    metFORMIN ER (GLUCOPHAGE-XR) 500 MG 24 hr tablet Take 2 tablets by mouth every morning and every evening. 120 tablet 3    montelukast (SINGULAIR) 10 MG tablet Take 1 tablet by mouth Every Night. 30 tablet 2    multivitamin with minerals tablet tablet Take 1 tablet by mouth Daily.      ondansetron (Zofran) 4 MG tablet Take 1 tablet by mouth Every 8 (Eight) Hours As Needed for Nausea. 10 tablet 1    Semaglutide-Weight Management (Wegovy) 2.4 MG/0.75ML solution auto-injector Inject 2.4 mg under the skin into the appropriate area as directed 1 (One) Time Per Week for 90 days. 9 mL 1    sertraline (Zoloft) 50 MG tablet Take 1 tablet by mouth Daily. 90 tablet 3     No current facility-administered medications on file prior to visit.       Penicillins    Past Medical History:   Diagnosis Date    Ankle sprain     Anxiety     Arthritis of back     Arthritis of neck     Cervical disc disorder     Fracture, fibula     Fracture, foot     Fracture, radius     Fracture, tibia and fibula     Fracture, ulna     Hypertension     Hypothyroidism     Knee swelling     Low back pain     Low back strain     Lumbosacral disc disease     Migraines     Other specified disorders involving the immune mechanism, not elsewhere classified     JORGE A-ROMBERG SYNDROME    Polycystic disease, ovaries     PCOS    Prediabetes     Subluxation of patella     Tear of meniscus of knee     Tendinitis of knee     Thoracic disc disorder        Social History     Socioeconomic History    Marital status:    Tobacco Use    Smoking status: Never    Smokeless tobacco: Never   Vaping Use    Vaping status: Never Used   Substance and Sexual Activity    Alcohol use: Not Currently     Comment: Socially    Drug use: Never    Sexual activity: Yes     Partners: Male     Birth control/protection: I.U.D.     Comment: Jacki since June 2023       Family History   Problem Relation Age of  Onset    JUNAID disease Mother     Hearing loss Father     Thyroid disease Father     Early death Father     Diabetes Father     Depression Father     Anxiety disorder Father     COPD Father     Hypertension Father     Hyperlipidemia Father     Heart disease Father     Heart attack Father     Anemia Father     Broken bones Father         Ankle    Hypertension Brother     JUNAID disease Brother     Broken bones Brother         Growth plates in wrists    No Known Problems Son     Diabetes Maternal Uncle     Hyperlipidemia Paternal Aunt     Heart disease Paternal Aunt     Hypertension Paternal Aunt     Thyroid disease Paternal Aunt     Anxiety disorder Paternal Aunt     Mental illness Maternal Grandfather     Cancer Maternal Grandfather         Prostate    Heart attack Paternal Grandmother     Vision loss Paternal Grandmother     Stroke Paternal Grandmother     Hypertension Paternal Grandmother     Hyperlipidemia Paternal Grandmother     Heart disease Paternal Grandmother     Hearing loss Paternal Grandmother     COPD Paternal Grandmother     Osteoporosis Paternal Grandmother     Scoliosis Paternal Grandmother     Early death Paternal Grandfather     Other Paternal Grandfather         BLACK LUNG       Review of Systems   Constitutional: Negative.    HENT: Negative.     Eyes: Negative.    Respiratory: Negative.     Cardiovascular:  Positive for leg swelling. Negative for chest pain and palpitations.   Gastrointestinal: Negative.    Endocrine: Negative.    Genitourinary: Negative.    Musculoskeletal:  Positive for back pain, neck pain and neck stiffness. Negative for joint swelling.   Skin: Negative.    Allergic/Immunologic: Negative.    Neurological:  Positive for headaches. Negative for dizziness, tremors, weakness, light-headedness and numbness.   Hematological: Negative.    Psychiatric/Behavioral: Negative.         Objective   Vitals:    08/08/24 1520   BP: 134/94   BP Location: Right arm   Patient Position: Sitting   Cuff  "Size: Large Adult   Pulse: 75   SpO2: 99%   Weight: 122 kg (269 lb)   Height: 175.3 cm (69.02\")      /94 (BP Location: Right arm, Patient Position: Sitting, Cuff Size: Large Adult)   Pulse 75   Ht 175.3 cm (69.02\")   Wt 122 kg (269 lb)   SpO2 99%   BMI 39.70 kg/m²    Lab Results (most recent)       None          Physical Exam  Vitals and nursing note reviewed.   Constitutional:       General: She is not in acute distress.     Appearance: She is well-developed.   HENT:      Head: Normocephalic and atraumatic.   Eyes:      Conjunctiva/sclera: Conjunctivae normal.      Pupils: Pupils are equal, round, and reactive to light.   Neck:      Vascular: No JVD.      Trachea: No tracheal deviation.   Cardiovascular:      Rate and Rhythm: Normal rate and regular rhythm.      Heart sounds: Normal heart sounds.   Pulmonary:      Effort: Pulmonary effort is normal.      Breath sounds: Normal breath sounds.   Abdominal:      General: Bowel sounds are normal. There is no distension.      Palpations: Abdomen is soft. There is no mass.      Tenderness: There is no abdominal tenderness. There is no guarding or rebound.   Musculoskeletal:         General: No tenderness or deformity. Normal range of motion.      Cervical back: Normal range of motion and neck supple.   Skin:     General: Skin is warm and dry.      Coloration: Skin is not pale.      Findings: No erythema or rash.   Neurological:      Mental Status: She is alert and oriented to person, place, and time.   Psychiatric:         Behavior: Behavior normal.         Thought Content: Thought content normal.         Judgment: Judgment normal.           Procedure     ECG 12 Lead    Date/Time: 8/8/2024 3:20 PM  Performed by: Good Green PA    Authorized by: Good Green PA             Assessment & Plan      Diagnosis Plan   1. Dyspnea on exertion        2. Primary hypertension        3. Other fatigue          1.  At this time, the patient is in for yearly " evaluation.  She continues to do well from general cardiovascular standpoint.  Dyspnea and fatigue, albeit minimal, remains at baseline.  She has no further cardiovascular symptoms or issues.    2.  On current medical regimen, she typically is normotensive when monitored at home.  We will continue medications without change.    3.  Prior cardiovascular workup has been benign.  Nothing further would be felt indicated.  We will continue to see her now on a yearly evaluation.  She will return for any issues.           Janeth Moctezuma  reports that she has never smoked. She has never used smokeless tobacco. I have educated her on the risk of diseases from using tobacco products such as cancer, COPD, and heart disease.                       Electronically signed by:

## 2024-09-23 LAB
MAXIMAL PREDICTED HEART RATE: 191 BPM
STRESS TARGET HR: 162 BPM

## 2024-10-10 DIAGNOSIS — E03.9 ACQUIRED HYPOTHYROIDISM: ICD-10-CM

## 2024-10-11 RX ORDER — LEVOTHYROXINE SODIUM 75 UG/1
TABLET ORAL
Qty: 90 TABLET | Refills: 3 | Status: SHIPPED | OUTPATIENT
Start: 2024-10-11

## 2024-11-13 ENCOUNTER — FLU SHOT (OUTPATIENT)
Dept: FAMILY MEDICINE CLINIC | Facility: CLINIC | Age: 39
End: 2024-11-13
Payer: COMMERCIAL

## 2024-11-13 DIAGNOSIS — F32.1 MAJOR DEPRESSIVE DISORDER, SINGLE EPISODE, MODERATE: ICD-10-CM

## 2024-11-13 DIAGNOSIS — Z23 IMMUNIZATION DUE: Primary | ICD-10-CM

## 2024-11-13 DIAGNOSIS — F41.1 GENERALIZED ANXIETY DISORDER: ICD-10-CM

## 2024-11-13 PROCEDURE — 90471 IMMUNIZATION ADMIN: CPT | Performed by: STUDENT IN AN ORGANIZED HEALTH CARE EDUCATION/TRAINING PROGRAM

## 2024-11-13 PROCEDURE — 90656 IIV3 VACC NO PRSV 0.5 ML IM: CPT | Performed by: STUDENT IN AN ORGANIZED HEALTH CARE EDUCATION/TRAINING PROGRAM

## 2024-11-13 RX ORDER — AMLODIPINE BESYLATE 10 MG/1
10 TABLET ORAL DAILY
Qty: 90 TABLET | Refills: 2 | Status: SHIPPED | OUTPATIENT
Start: 2024-11-13

## 2024-11-13 RX ORDER — HYDROCHLOROTHIAZIDE 25 MG/1
25 TABLET ORAL DAILY
Qty: 90 TABLET | Refills: 2 | Status: SHIPPED | OUTPATIENT
Start: 2024-11-13

## 2024-11-26 ENCOUNTER — LAB (OUTPATIENT)
Dept: FAMILY MEDICINE CLINIC | Facility: CLINIC | Age: 39
End: 2024-11-26
Payer: COMMERCIAL

## 2024-11-26 DIAGNOSIS — R73.9 HYPERGLYCEMIA: ICD-10-CM

## 2024-11-26 DIAGNOSIS — F32.1 MAJOR DEPRESSIVE DISORDER, SINGLE EPISODE, MODERATE: ICD-10-CM

## 2024-11-26 DIAGNOSIS — F41.1 GENERALIZED ANXIETY DISORDER: ICD-10-CM

## 2024-11-26 DIAGNOSIS — R53.83 OTHER FATIGUE: Primary | ICD-10-CM

## 2024-11-26 DIAGNOSIS — R53.83 OTHER FATIGUE: ICD-10-CM

## 2024-11-26 LAB
ALBUMIN SERPL-MCNC: 4.1 G/DL (ref 3.5–5.2)
ALBUMIN/GLOB SERPL: 1.5 G/DL
ALP SERPL-CCNC: 103 U/L (ref 39–117)
ALT SERPL W P-5'-P-CCNC: 16 U/L (ref 1–33)
ANION GAP SERPL CALCULATED.3IONS-SCNC: 11.1 MMOL/L (ref 5–15)
AST SERPL-CCNC: 13 U/L (ref 1–32)
BILIRUB SERPL-MCNC: 0.9 MG/DL (ref 0–1.2)
BUN SERPL-MCNC: 9 MG/DL (ref 6–20)
BUN/CREAT SERPL: 13.6 (ref 7–25)
CALCIUM SPEC-SCNC: 9.1 MG/DL (ref 8.6–10.5)
CHLORIDE SERPL-SCNC: 103 MMOL/L (ref 98–107)
CO2 SERPL-SCNC: 24.9 MMOL/L (ref 22–29)
CREAT SERPL-MCNC: 0.66 MG/DL (ref 0.57–1)
DEPRECATED RDW RBC AUTO: 42.8 FL (ref 37–54)
EGFRCR SERPLBLD CKD-EPI 2021: 116 ML/MIN/1.73
ERYTHROCYTE [DISTWIDTH] IN BLOOD BY AUTOMATED COUNT: 14.6 % (ref 12.3–15.4)
GLOBULIN UR ELPH-MCNC: 2.8 GM/DL
GLUCOSE SERPL-MCNC: 91 MG/DL (ref 65–99)
HBA1C MFR BLD: 5.1 % (ref 4.8–5.6)
HCT VFR BLD AUTO: 42.4 % (ref 34–46.6)
HGB BLD-MCNC: 13.5 G/DL (ref 12–15.9)
MCH RBC QN AUTO: 25.9 PG (ref 26.6–33)
MCHC RBC AUTO-ENTMCNC: 31.8 G/DL (ref 31.5–35.7)
MCV RBC AUTO: 81.4 FL (ref 79–97)
PLATELET # BLD AUTO: 298 10*3/MM3 (ref 140–450)
PMV BLD AUTO: 10.7 FL (ref 6–12)
POTASSIUM SERPL-SCNC: 3.9 MMOL/L (ref 3.5–5.2)
PROT SERPL-MCNC: 6.9 G/DL (ref 6–8.5)
RBC # BLD AUTO: 5.21 10*6/MM3 (ref 3.77–5.28)
SODIUM SERPL-SCNC: 139 MMOL/L (ref 136–145)
TSH SERPL DL<=0.05 MIU/L-ACNC: 1.32 UIU/ML (ref 0.27–4.2)
WBC NRBC COR # BLD AUTO: 5.88 10*3/MM3 (ref 3.4–10.8)

## 2024-11-26 PROCEDURE — 80050 GENERAL HEALTH PANEL: CPT | Performed by: STUDENT IN AN ORGANIZED HEALTH CARE EDUCATION/TRAINING PROGRAM

## 2024-11-26 PROCEDURE — 83036 HEMOGLOBIN GLYCOSYLATED A1C: CPT | Performed by: STUDENT IN AN ORGANIZED HEALTH CARE EDUCATION/TRAINING PROGRAM

## 2024-11-26 PROCEDURE — 36415 COLL VENOUS BLD VENIPUNCTURE: CPT

## 2024-11-26 RX ORDER — SERTRALINE HYDROCHLORIDE 100 MG/1
100 TABLET, FILM COATED ORAL DAILY
Qty: 90 TABLET | Refills: 3 | Status: SHIPPED | OUTPATIENT
Start: 2024-11-26

## 2024-12-10 RX ORDER — BROMPHENIRAMINE MALEATE, PSEUDOEPHEDRINE HYDROCHLORIDE, AND DEXTROMETHORPHAN HYDROBROMIDE 2; 30; 10 MG/5ML; MG/5ML; MG/5ML
5 SYRUP ORAL 4 TIMES DAILY PRN
Qty: 473 ML | Refills: 0 | Status: SHIPPED | OUTPATIENT
Start: 2024-12-10

## 2024-12-31 DIAGNOSIS — J01.00 ACUTE NON-RECURRENT MAXILLARY SINUSITIS: Primary | ICD-10-CM

## 2024-12-31 RX ORDER — ONDANSETRON 4 MG/1
4 TABLET, ORALLY DISINTEGRATING ORAL EVERY 8 HOURS PRN
Qty: 30 TABLET | Refills: 0 | Status: SHIPPED | OUTPATIENT
Start: 2024-12-31

## 2024-12-31 RX ORDER — DOXYCYCLINE 100 MG/1
100 CAPSULE ORAL 2 TIMES DAILY
Qty: 20 CAPSULE | Refills: 0 | Status: SHIPPED | OUTPATIENT
Start: 2024-12-31

## 2025-01-02 DIAGNOSIS — J30.2 SEASONAL ALLERGIES: ICD-10-CM

## 2025-01-02 RX ORDER — MONTELUKAST SODIUM 10 MG/1
10 TABLET ORAL NIGHTLY
Qty: 30 TABLET | Refills: 0 | Status: SHIPPED | OUTPATIENT
Start: 2025-01-02

## 2025-01-16 ENCOUNTER — LAB (OUTPATIENT)
Dept: FAMILY MEDICINE CLINIC | Facility: CLINIC | Age: 40
End: 2025-01-16
Payer: COMMERCIAL

## 2025-01-16 DIAGNOSIS — R10.12 LEFT UPPER QUADRANT ABDOMINAL PAIN: Primary | ICD-10-CM

## 2025-01-16 DIAGNOSIS — R10.12 LEFT UPPER QUADRANT ABDOMINAL PAIN: ICD-10-CM

## 2025-01-16 PROCEDURE — 85027 COMPLETE CBC AUTOMATED: CPT | Performed by: STUDENT IN AN ORGANIZED HEALTH CARE EDUCATION/TRAINING PROGRAM

## 2025-01-16 PROCEDURE — 86140 C-REACTIVE PROTEIN: CPT | Performed by: STUDENT IN AN ORGANIZED HEALTH CARE EDUCATION/TRAINING PROGRAM

## 2025-01-16 PROCEDURE — 36415 COLL VENOUS BLD VENIPUNCTURE: CPT

## 2025-01-16 PROCEDURE — 80053 COMPREHEN METABOLIC PANEL: CPT | Performed by: STUDENT IN AN ORGANIZED HEALTH CARE EDUCATION/TRAINING PROGRAM

## 2025-01-17 ENCOUNTER — OFFICE VISIT (OUTPATIENT)
Dept: FAMILY MEDICINE CLINIC | Facility: CLINIC | Age: 40
End: 2025-01-17
Payer: COMMERCIAL

## 2025-01-17 ENCOUNTER — HOSPITAL ENCOUNTER (OUTPATIENT)
Dept: CT IMAGING | Facility: HOSPITAL | Age: 40
Discharge: HOME | End: 2025-01-17
Admitting: STUDENT IN AN ORGANIZED HEALTH CARE EDUCATION/TRAINING PROGRAM
Payer: COMMERCIAL

## 2025-01-17 VITALS
OXYGEN SATURATION: 96 % | WEIGHT: 268.7 LBS | BODY MASS INDEX: 39.8 KG/M2 | SYSTOLIC BLOOD PRESSURE: 96 MMHG | DIASTOLIC BLOOD PRESSURE: 74 MMHG | HEIGHT: 69 IN | TEMPERATURE: 97.1 F | HEART RATE: 79 BPM

## 2025-01-17 DIAGNOSIS — K57.92 DIVERTICULITIS: Primary | ICD-10-CM

## 2025-01-17 DIAGNOSIS — R10.13 EPIGASTRIC ABDOMINAL PAIN: ICD-10-CM

## 2025-01-17 DIAGNOSIS — R10.13 EPIGASTRIC ABDOMINAL PAIN: Primary | ICD-10-CM

## 2025-01-17 LAB
ALBUMIN SERPL-MCNC: 3.8 G/DL (ref 3.5–5.2)
ALBUMIN/GLOB SERPL: 1 G/DL
ALP SERPL-CCNC: 107 U/L (ref 39–117)
ALT SERPL W P-5'-P-CCNC: 15 U/L (ref 1–33)
ANION GAP SERPL CALCULATED.3IONS-SCNC: 11.6 MMOL/L (ref 5–15)
AST SERPL-CCNC: 14 U/L (ref 1–32)
BILIRUB SERPL-MCNC: 2 MG/DL (ref 0–1.2)
BUN SERPL-MCNC: 11 MG/DL (ref 6–20)
BUN/CREAT SERPL: 12.9 (ref 7–25)
CALCIUM SPEC-SCNC: 9.5 MG/DL (ref 8.6–10.5)
CHLORIDE SERPL-SCNC: 97 MMOL/L (ref 98–107)
CO2 SERPL-SCNC: 28.4 MMOL/L (ref 22–29)
CREAT SERPL-MCNC: 0.85 MG/DL (ref 0.57–1)
CRP SERPL-MCNC: 7.87 MG/DL (ref 0–0.5)
DEPRECATED RDW RBC AUTO: 41.5 FL (ref 37–54)
EGFRCR SERPLBLD CKD-EPI 2021: 90.6 ML/MIN/1.73
ERYTHROCYTE [DISTWIDTH] IN BLOOD BY AUTOMATED COUNT: 14 % (ref 12.3–15.4)
GLOBULIN UR ELPH-MCNC: 3.8 GM/DL
GLUCOSE SERPL-MCNC: 90 MG/DL (ref 65–99)
HCT VFR BLD AUTO: 43.4 % (ref 34–46.6)
HGB BLD-MCNC: 14.1 G/DL (ref 12–15.9)
MCH RBC QN AUTO: 26.5 PG (ref 26.6–33)
MCHC RBC AUTO-ENTMCNC: 32.5 G/DL (ref 31.5–35.7)
MCV RBC AUTO: 81.6 FL (ref 79–97)
PLATELET # BLD AUTO: 325 10*3/MM3 (ref 140–450)
PMV BLD AUTO: 10.7 FL (ref 6–12)
POTASSIUM SERPL-SCNC: 4.2 MMOL/L (ref 3.5–5.2)
PROT SERPL-MCNC: 7.6 G/DL (ref 6–8.5)
RBC # BLD AUTO: 5.32 10*6/MM3 (ref 3.77–5.28)
SODIUM SERPL-SCNC: 137 MMOL/L (ref 136–145)
WBC NRBC COR # BLD AUTO: 9.96 10*3/MM3 (ref 3.4–10.8)

## 2025-01-17 PROCEDURE — 74176 CT ABD & PELVIS W/O CONTRAST: CPT | Performed by: RADIOLOGY

## 2025-01-17 PROCEDURE — 99213 OFFICE O/P EST LOW 20 MIN: CPT | Performed by: STUDENT IN AN ORGANIZED HEALTH CARE EDUCATION/TRAINING PROGRAM

## 2025-01-17 PROCEDURE — 74176 CT ABD & PELVIS W/O CONTRAST: CPT

## 2025-01-17 RX ORDER — CIPROFLOXACIN 500 MG/1
500 TABLET, FILM COATED ORAL 2 TIMES DAILY
Qty: 20 TABLET | Refills: 0 | Status: SHIPPED | OUTPATIENT
Start: 2025-01-17 | End: 2025-01-27

## 2025-01-17 RX ORDER — METRONIDAZOLE 500 MG/1
500 TABLET ORAL 3 TIMES DAILY
Qty: 21 TABLET | Refills: 0 | Status: SHIPPED | OUTPATIENT
Start: 2025-01-17 | End: 2025-01-24

## 2025-01-17 NOTE — PROGRESS NOTES
Chief Complaint  No chief complaint on file.    HPI:   HPI   Janeth Moctezuma is a 39 y.o. female who presents today to [facility] for No chief complaint on file.. Patient developed abdominal pain on Monday, worsened throughout the week. She had a menstrual cycle during this time. This is the first true menstrual cycle since IUD placement. Pain is left lower quadrant. Yesterday she had severe abdominal pain, and mild hypotension, systolic 92. She also had mild hypoglycemia. She denies nausea, vomiting and diarrhea. She does have a gallbladder.     Previous History:   Past Medical History:   Diagnosis Date    Ankle sprain     Anxiety     Arthritis of back     Arthritis of neck     Cervical disc disorder     Fracture, fibula     Fracture, foot     Fracture, radius     Fracture, tibia and fibula     Fracture, ulna     Hypertension     Hypothyroidism     Knee swelling     Low back pain     Low back strain     Lumbosacral disc disease     Migraines     Other specified disorders involving the immune mechanism, not elsewhere classified     JORGE A-ROMBERG SYNDROME    Polycystic disease, ovaries     PCOS    Prediabetes     Subluxation of patella     Tear of meniscus of knee     Tendinitis of knee     Thoracic disc disorder       Past Surgical History:   Procedure Laterality Date     SECTION  2021    [facility], DR. STOVALL    FACIAL RECONSTRUCTION SURGERY  2017    KNEE ARTHROSCOPY Right 2024    KNEE SURGERY Right     Medial meniscus, repair ligaments and tendons (all except ACL)      Social History     Socioeconomic History    Marital status:    Tobacco Use    Smoking status: Never    Smokeless tobacco: Never   Vaping Use    Vaping status: Never Used   Substance and Sexual Activity    Alcohol use: Not Currently     Comment: Socially    Drug use: Never    Sexual activity: Yes     Partners: Male     Birth control/protection: I.U.D.     Comment: Jacki since 2023       Health Maintenance Due   Topic Date Due    TDAP/TD VACCINES (1 - Tdap) Never done    HEPATITIS C SCREENING  Never done    ANNUAL PHYSICAL  Never done    COVID-19 Vaccine (3 - 2024-25 season) 09/01/2024        Current Medications:  Current Outpatient Medications   Medication Sig Dispense Refill    amLODIPine (NORVASC) 10 MG tablet Take 1 tablet by mouth Daily. 90 tablet 2    aspirin 81 MG EC tablet Take 1 tablet by mouth Daily.      brompheniramine-pseudoephedrine-DM 30-2-10 MG/5ML syrup Take 5 mL by mouth 4 (Four) Times a Day As Needed for Allergies. 473 mL 0    carvedilol (COREG) 25 MG tablet Take 1 tablet by mouth 2 (Two) Times a Day. 180 tablet 3    cetirizine (zyrTEC) 10 MG tablet Take 1 tablet by mouth Daily. 90 tablet 3    cloNIDine (CATAPRES) 0.1 MG tablet Take 1 tablet by mouth 3 (Three) Times a Day As Needed for High Blood Pressure (> 160/90). 30 tablet 1    diclofenac (CATAFLAM) 50 MG tablet Take 1 tablet by mouth 2 (Two) Times a Day As Needed (back pain). 60 tablet 0    doxycycline (VIBRAMYCIN) 100 MG capsule Take 1 capsule by mouth 2 (Two) Times a Day. 20 capsule 0    folic acid (FOLVITE) 400 MCG tablet Take 1 tablet by mouth Daily.      hydroCHLOROthiazide 25 MG tablet Take 1 tablet by mouth Daily. 90 tablet 2    hydrOXYzine (ATARAX) 25 MG tablet Take 1 tablet by mouth 3 (Three) Times a Day As Needed for Anxiety. 90 tablet 0    L-Lysine 1000 MG tablet Take 1 tablet by mouth Daily.      levothyroxine (SYNTHROID, LEVOTHROID) 75 MCG tablet Take 1 tablet by mouth in the morning on an empty stomach 90 tablet 3    metFORMIN ER (GLUCOPHAGE-XR) 500 MG 24 hr tablet Take 2 tablets by mouth every morning and every evening. 120 tablet 3    montelukast (SINGULAIR) 10 MG tablet Take 1 tablet by mouth Every Night. 30 tablet 0    multivitamin with minerals tablet tablet Take 1 tablet by mouth Daily.      ondansetron (Zofran) 4 MG tablet Take 1 tablet by mouth Every 8 (Eight) Hours As Needed for Nausea. 10 tablet 1  "   ondansetron ODT (ZOFRAN-ODT) 4 MG disintegrating tablet Place 1 tablet on the tongue Every 8 (Eight) Hours As Needed for Nausea. 30 tablet 0    sertraline (Zoloft) 100 MG tablet Take 1 tablet by mouth Daily. 90 tablet 3     No current facility-administered medications for this visit.       Allergies:   Allergies   Allergen Reactions    Penicillins Rash       Vitals:   There were no vitals taken for this visit.    Estimated body mass index is 39.7 kg/m² as calculated from the following:    Height as of 8/8/24: 175.3 cm (69.02\").    Weight as of 8/8/24: 122 kg (269 lb).    Janeth Moctezuma  reports that she has never smoked. She has never used smokeless tobacco.            Physical Exam:   Physical Exam  Constitutional:       Appearance: Normal appearance.   HENT:      Mouth/Throat:      Mouth: Mucous membranes are moist.   Cardiovascular:      Rate and Rhythm: Normal rate and regular rhythm.   Pulmonary:      Effort: Pulmonary effort is normal.      Breath sounds: Normal breath sounds. No wheezing or rhonchi.   Abdominal:      General: Abdomen is flat.      Palpations: Abdomen is soft.      Tenderness: There is abdominal tenderness (Severe epigastric abdominal tenderness).      Comments: RUQ non-tender, mild LLQ tenderness.    Musculoskeletal:         General: Normal range of motion.   Skin:     General: Skin is warm and dry.   Neurological:      Mental Status: She is alert.   Psychiatric:         Mood and Affect: Mood normal.          Lab Results:   Lab on 01/16/2025   Component Date Value Ref Range Status    WBC 01/16/2025 9.96  3.40 - 10.80 10*3/mm3 Final    RBC 01/16/2025 5.32 (H)  3.77 - 5.28 10*6/mm3 Final    Hemoglobin 01/16/2025 14.1  12.0 - 15.9 g/dL Final    Hematocrit 01/16/2025 43.4  34.0 - 46.6 % Final    MCV 01/16/2025 81.6  79.0 - 97.0 fL Final    MCH 01/16/2025 26.5 (L)  26.6 - 33.0 pg Final    MCHC 01/16/2025 32.5  31.5 - 35.7 g/dL Final    RDW 01/16/2025 14.0  12.3 - 15.4 % Final    RDW-SD " 01/16/2025 41.5  37.0 - 54.0 fl Final    MPV 01/16/2025 10.7  6.0 - 12.0 fL Final    Platelets 01/16/2025 325  140 - 450 10*3/mm3 Final    Glucose 01/16/2025 90  65 - 99 mg/dL Final    BUN 01/16/2025 11  6 - 20 mg/dL Final    Creatinine 01/16/2025 0.85  0.57 - 1.00 mg/dL Final    Sodium 01/16/2025 137  136 - 145 mmol/L Final    Potassium 01/16/2025 4.2  3.5 - 5.2 mmol/L Final    Chloride 01/16/2025 97 (L)  98 - 107 mmol/L Final    CO2 01/16/2025 28.4  22.0 - 29.0 mmol/L Final    Calcium 01/16/2025 9.5  8.6 - 10.5 mg/dL Final    Total Protein 01/16/2025 7.6  6.0 - 8.5 g/dL Final    Albumin 01/16/2025 3.8  3.5 - 5.2 g/dL Final    ALT (SGPT) 01/16/2025 15  1 - 33 U/L Final    AST (SGOT) 01/16/2025 14  1 - 32 U/L Final    Alkaline Phosphatase 01/16/2025 107  39 - 117 U/L Final    Total Bilirubin 01/16/2025 2.0 (H)  0.0 - 1.2 mg/dL Final    Globulin 01/16/2025 3.8  gm/dL Final    A/G Ratio 01/16/2025 1.0  g/dL Final    BUN/Creatinine Ratio 01/16/2025 12.9  7.0 - 25.0 Final    Anion Gap 01/16/2025 11.6  5.0 - 15.0 mmol/L Final    eGFR 01/16/2025 90.6  >60.0 mL/min/1.73 Final    C-Reactive Protein 01/16/2025 7.87 (H)  0.00 - 0.50 mg/dL Final   Lab on 11/26/2024   Component Date Value Ref Range Status    WBC 11/26/2024 5.88  3.40 - 10.80 10*3/mm3 Final    RBC 11/26/2024 5.21  3.77 - 5.28 10*6/mm3 Final    Hemoglobin 11/26/2024 13.5  12.0 - 15.9 g/dL Final    Hematocrit 11/26/2024 42.4  34.0 - 46.6 % Final    MCV 11/26/2024 81.4  79.0 - 97.0 fL Final    MCH 11/26/2024 25.9 (L)  26.6 - 33.0 pg Final    MCHC 11/26/2024 31.8  31.5 - 35.7 g/dL Final    RDW 11/26/2024 14.6  12.3 - 15.4 % Final    RDW-SD 11/26/2024 42.8  37.0 - 54.0 fl Final    MPV 11/26/2024 10.7  6.0 - 12.0 fL Final    Platelets 11/26/2024 298  140 - 450 10*3/mm3 Final    Glucose 11/26/2024 91  65 - 99 mg/dL Final    BUN 11/26/2024 9  6 - 20 mg/dL Final    Creatinine 11/26/2024 0.66  0.57 - 1.00 mg/dL Final    Sodium 11/26/2024 139  136 - 145 mmol/L Final     Potassium 11/26/2024 3.9  3.5 - 5.2 mmol/L Final    Chloride 11/26/2024 103  98 - 107 mmol/L Final    CO2 11/26/2024 24.9  22.0 - 29.0 mmol/L Final    Calcium 11/26/2024 9.1  8.6 - 10.5 mg/dL Final    Total Protein 11/26/2024 6.9  6.0 - 8.5 g/dL Final    Albumin 11/26/2024 4.1  3.5 - 5.2 g/dL Final    ALT (SGPT) 11/26/2024 16  1 - 33 U/L Final    AST (SGOT) 11/26/2024 13  1 - 32 U/L Final    Alkaline Phosphatase 11/26/2024 103  39 - 117 U/L Final    Total Bilirubin 11/26/2024 0.9  0.0 - 1.2 mg/dL Final    Globulin 11/26/2024 2.8  gm/dL Final    A/G Ratio 11/26/2024 1.5  g/dL Final    BUN/Creatinine Ratio 11/26/2024 13.6  7.0 - 25.0 Final    Anion Gap 11/26/2024 11.1  5.0 - 15.0 mmol/L Final    eGFR 11/26/2024 116.0  >60.0 mL/min/1.73 Final    TSH 11/26/2024 1.320  0.270 - 4.200 uIU/mL Final    Hemoglobin A1C 11/26/2024 5.10  4.80 - 5.60 % Final       Assessment and Plan  Diagnoses and all orders for this visit:    1. Epigastric abdominal pain (Primary)  -     CT Abdomen Pelvis Without Contrast; Future    Patient has severe epigastric tenderness and mild LLQ tenderness. Her CRP is 7.8 and bilrubin is elevated. Concerned for intraabdominal pathology including pancreatitis, cholecystitis, diverticulitis. Stat CT scan to help with diagnosis and treatment plan.             New Medications:   No orders of the defined types were placed in this encounter.      Discontinued Medications:   There are no discontinued medications.              Follow Up:   No follow-ups on file.    Patient was given instructions and counseling regarding her condition or for health maintenance advice. Please see specific information pulled into the AVS if appropriate.       This document has been electronically signed by Arvin Gonzalez DO   January 17, 2025 11:44 EST    Dictated Utilizing Dragon Dictation: Part of this note may be an electronic transcription/translation of spoken language to printed text using the Dragon Dictation System.

## 2025-01-30 DIAGNOSIS — J30.2 SEASONAL ALLERGIES: ICD-10-CM

## 2025-01-30 DIAGNOSIS — E28.2 PCOS (POLYCYSTIC OVARIAN SYNDROME): ICD-10-CM

## 2025-01-30 RX ORDER — METFORMIN HYDROCHLORIDE 500 MG/1
TABLET, EXTENDED RELEASE ORAL
Qty: 120 TABLET | Refills: 3 | Status: SHIPPED | OUTPATIENT
Start: 2025-01-30

## 2025-01-30 RX ORDER — MONTELUKAST SODIUM 10 MG/1
10 TABLET ORAL NIGHTLY
Qty: 30 TABLET | Refills: 0 | Status: SHIPPED | OUTPATIENT
Start: 2025-01-30

## 2025-03-01 DIAGNOSIS — J30.2 SEASONAL ALLERGIES: ICD-10-CM

## 2025-03-03 RX ORDER — MONTELUKAST SODIUM 10 MG/1
10 TABLET ORAL NIGHTLY
Qty: 30 TABLET | Refills: 2 | Status: SHIPPED | OUTPATIENT
Start: 2025-03-03

## 2025-03-03 RX ORDER — CARVEDILOL 25 MG/1
25 TABLET ORAL 2 TIMES DAILY
Qty: 180 TABLET | Refills: 3 | Status: SHIPPED | OUTPATIENT
Start: 2025-03-03

## 2025-04-30 ENCOUNTER — OFFICE VISIT (OUTPATIENT)
Dept: FAMILY MEDICINE CLINIC | Facility: CLINIC | Age: 40
End: 2025-04-30
Payer: COMMERCIAL

## 2025-04-30 VITALS
SYSTOLIC BLOOD PRESSURE: 112 MMHG | WEIGHT: 287 LBS | HEART RATE: 71 BPM | HEIGHT: 69 IN | DIASTOLIC BLOOD PRESSURE: 82 MMHG | OXYGEN SATURATION: 98 % | BODY MASS INDEX: 42.51 KG/M2 | TEMPERATURE: 96.8 F

## 2025-04-30 DIAGNOSIS — L98.9 SKIN LESION OF FACE: Primary | ICD-10-CM

## 2025-04-30 PROCEDURE — 99213 OFFICE O/P EST LOW 20 MIN: CPT | Performed by: STUDENT IN AN ORGANIZED HEALTH CARE EDUCATION/TRAINING PROGRAM

## 2025-04-30 NOTE — PROGRESS NOTES
Chief Complaint  Skin Lesion    HPI:   HPI   Janeth Moctezuma is a 39 y.o. female who presents today to [facility] for Skin Lesion. Patient presents for a lesion on her right cheek. It has been present for several months, but reports it has been growing. She denies cracking or bleeding.     Previous History:   Past Medical History:   Diagnosis Date    Ankle sprain     Anxiety     Arthritis of back     Arthritis of neck     Cervical disc disorder     Fracture, fibula     Fracture, foot     Fracture, radius     Fracture, tibia and fibula     Fracture, ulna     Hypertension     Hypothyroidism     Knee swelling     Low back pain     Low back strain     Lumbosacral disc disease     Migraines     Other specified disorders involving the immune mechanism, not elsewhere classified     JORGE A-ROMBERG SYNDROME    Polycystic disease, ovaries     PCOS    Prediabetes     Subluxation of patella     Tear of meniscus of knee     Tendinitis of knee     Thoracic disc disorder       Past Surgical History:   Procedure Laterality Date     SECTION  2021    [facility], DR. STOVALL    FACIAL RECONSTRUCTION SURGERY  2017    KNEE ARTHROSCOPY Right 2024    KNEE SURGERY Right     Medial meniscus, repair ligaments and tendons (all except ACL)      Social History     Socioeconomic History    Marital status:    Tobacco Use    Smoking status: Never    Smokeless tobacco: Never   Vaping Use    Vaping status: Never Used   Substance and Sexual Activity    Alcohol use: Not Currently     Comment: Socially    Drug use: Never    Sexual activity: Yes     Partners: Male     Birth control/protection: I.U.D.     Comment: Jacki since 2023      Health Maintenance Due   Topic Date Due    TDAP/TD VACCINES (1 - Tdap) Never done    HEPATITIS C SCREENING  Never done    ANNUAL PHYSICAL  Never done    COVID-19 Vaccine (3 - 2024-25 season) 2024        Current Medications:  Current Outpatient  "Medications   Medication Sig Dispense Refill    amLODIPine (NORVASC) 10 MG tablet Take 1 tablet by mouth Daily. 90 tablet 2    aspirin 81 MG EC tablet Take 1 tablet by mouth Daily.      carvedilol (COREG) 25 MG tablet Take 1 tablet by mouth 2 (Two) Times a Day. 180 tablet 3    folic acid (FOLVITE) 400 MCG tablet Take 1 tablet by mouth Daily.      hydroCHLOROthiazide 25 MG tablet Take 1 tablet by mouth Daily. 90 tablet 2    levothyroxine (SYNTHROID, LEVOTHROID) 75 MCG tablet Take 1 tablet by mouth in the morning on an empty stomach 90 tablet 3    montelukast (SINGULAIR) 10 MG tablet Take 1 tablet by mouth Every Night. Need to schedule follow up. 30 tablet 2    sertraline (Zoloft) 100 MG tablet Take 1 tablet by mouth Daily. 90 tablet 3    cetirizine (zyrTEC) 10 MG tablet Take 1 tablet by mouth Daily. (Patient not taking: Reported on 4/30/2025) 90 tablet 3    cloNIDine (CATAPRES) 0.1 MG tablet Take 1 tablet by mouth 3 (Three) Times a Day As Needed for High Blood Pressure (> 160/90). (Patient not taking: Reported on 4/30/2025) 30 tablet 1    diclofenac (CATAFLAM) 50 MG tablet Take 1 tablet by mouth 2 (Two) Times a Day As Needed (back pain). (Patient not taking: Reported on 4/30/2025) 60 tablet 0    metFORMIN ER (GLUCOPHAGE-XR) 500 MG 24 hr tablet Take 2 tablets by mouth Every Morning AND 2 tablets Every Evening. (Patient not taking: Reported on 4/30/2025) 120 tablet 3    ondansetron (Zofran) 4 MG tablet Take 1 tablet by mouth Every 8 (Eight) Hours As Needed for Nausea. (Patient not taking: Reported on 4/30/2025) 10 tablet 1     No current facility-administered medications for this visit.       Allergies:   Allergies   Allergen Reactions    Penicillins Rash       Vitals:   /82 (BP Location: Right arm, Patient Position: Sitting, Cuff Size: Adult)   Pulse 71   Temp 96.8 °F (36 °C) (Temporal)   Ht 175.3 cm (69.02\")   Wt 130 kg (287 lb)   SpO2 98%   BMI 42.36 kg/m²     Estimated body mass index is 42.36 kg/m² as " "calculated from the following:    Height as of this encounter: 175.3 cm (69.02\").    Weight as of this encounter: 130 kg (287 lb).    Janeth Moctezuma  reports that she has never smoked. She has never used smokeless tobacco.            Physical Exam:   Physical Exam  Constitutional:       Appearance: Normal appearance.   HENT:      Head:        Comments: 1mm circular flesh colored papule with rolled borders     Mouth/Throat:      Mouth: Mucous membranes are moist.   Cardiovascular:      Rate and Rhythm: Normal rate and regular rhythm.   Pulmonary:      Effort: Pulmonary effort is normal.      Breath sounds: Normal breath sounds. No wheezing or rhonchi.   Musculoskeletal:         General: Normal range of motion.   Skin:     General: Skin is warm and dry.   Neurological:      Mental Status: She is alert.   Psychiatric:         Mood and Affect: Mood normal.          Lab Results:   Lab on 01/16/2025   Component Date Value Ref Range Status    WBC 01/16/2025 9.96  3.40 - 10.80 10*3/mm3 Final    RBC 01/16/2025 5.32 (H)  3.77 - 5.28 10*6/mm3 Final    Hemoglobin 01/16/2025 14.1  12.0 - 15.9 g/dL Final    Hematocrit 01/16/2025 43.4  34.0 - 46.6 % Final    MCV 01/16/2025 81.6  79.0 - 97.0 fL Final    MCH 01/16/2025 26.5 (L)  26.6 - 33.0 pg Final    MCHC 01/16/2025 32.5  31.5 - 35.7 g/dL Final    RDW 01/16/2025 14.0  12.3 - 15.4 % Final    RDW-SD 01/16/2025 41.5  37.0 - 54.0 fl Final    MPV 01/16/2025 10.7  6.0 - 12.0 fL Final    Platelets 01/16/2025 325  140 - 450 10*3/mm3 Final    Glucose 01/16/2025 90  65 - 99 mg/dL Final    BUN 01/16/2025 11  6 - 20 mg/dL Final    Creatinine 01/16/2025 0.85  0.57 - 1.00 mg/dL Final    Sodium 01/16/2025 137  136 - 145 mmol/L Final    Potassium 01/16/2025 4.2  3.5 - 5.2 mmol/L Final    Chloride 01/16/2025 97 (L)  98 - 107 mmol/L Final    CO2 01/16/2025 28.4  22.0 - 29.0 mmol/L Final    Calcium 01/16/2025 9.5  8.6 - 10.5 mg/dL Final    Total Protein 01/16/2025 7.6  6.0 - 8.5 g/dL Final    Albumin " 01/16/2025 3.8  3.5 - 5.2 g/dL Final    ALT (SGPT) 01/16/2025 15  1 - 33 U/L Final    AST (SGOT) 01/16/2025 14  1 - 32 U/L Final    Alkaline Phosphatase 01/16/2025 107  39 - 117 U/L Final    Total Bilirubin 01/16/2025 2.0 (H)  0.0 - 1.2 mg/dL Final    Globulin 01/16/2025 3.8  gm/dL Final    A/G Ratio 01/16/2025 1.0  g/dL Final    BUN/Creatinine Ratio 01/16/2025 12.9  7.0 - 25.0 Final    Anion Gap 01/16/2025 11.6  5.0 - 15.0 mmol/L Final    eGFR 01/16/2025 90.6  >60.0 mL/min/1.73 Final    C-Reactive Protein 01/16/2025 7.87 (H)  0.00 - 0.50 mg/dL Final   Lab on 11/26/2024   Component Date Value Ref Range Status    WBC 11/26/2024 5.88  3.40 - 10.80 10*3/mm3 Final    RBC 11/26/2024 5.21  3.77 - 5.28 10*6/mm3 Final    Hemoglobin 11/26/2024 13.5  12.0 - 15.9 g/dL Final    Hematocrit 11/26/2024 42.4  34.0 - 46.6 % Final    MCV 11/26/2024 81.4  79.0 - 97.0 fL Final    MCH 11/26/2024 25.9 (L)  26.6 - 33.0 pg Final    MCHC 11/26/2024 31.8  31.5 - 35.7 g/dL Final    RDW 11/26/2024 14.6  12.3 - 15.4 % Final    RDW-SD 11/26/2024 42.8  37.0 - 54.0 fl Final    MPV 11/26/2024 10.7  6.0 - 12.0 fL Final    Platelets 11/26/2024 298  140 - 450 10*3/mm3 Final    Glucose 11/26/2024 91  65 - 99 mg/dL Final    BUN 11/26/2024 9  6 - 20 mg/dL Final    Creatinine 11/26/2024 0.66  0.57 - 1.00 mg/dL Final    Sodium 11/26/2024 139  136 - 145 mmol/L Final    Potassium 11/26/2024 3.9  3.5 - 5.2 mmol/L Final    Chloride 11/26/2024 103  98 - 107 mmol/L Final    CO2 11/26/2024 24.9  22.0 - 29.0 mmol/L Final    Calcium 11/26/2024 9.1  8.6 - 10.5 mg/dL Final    Total Protein 11/26/2024 6.9  6.0 - 8.5 g/dL Final    Albumin 11/26/2024 4.1  3.5 - 5.2 g/dL Final    ALT (SGPT) 11/26/2024 16  1 - 33 U/L Final    AST (SGOT) 11/26/2024 13  1 - 32 U/L Final    Alkaline Phosphatase 11/26/2024 103  39 - 117 U/L Final    Total Bilirubin 11/26/2024 0.9  0.0 - 1.2 mg/dL Final    Globulin 11/26/2024 2.8  gm/dL Final    A/G Ratio 11/26/2024 1.5  g/dL Final     BUN/Creatinine Ratio 11/26/2024 13.6  7.0 - 25.0 Final    Anion Gap 11/26/2024 11.1  5.0 - 15.0 mmol/L Final    eGFR 11/26/2024 116.0  >60.0 mL/min/1.73 Final    TSH 11/26/2024 1.320  0.270 - 4.200 uIU/mL Final    Hemoglobin A1C 11/26/2024 5.10  4.80 - 5.60 % Final       Assessment and Plan  Diagnoses and all orders for this visit:    1. Skin lesion of face (Primary)      Skin lesion concern for BCC  1mm circular flesh colored papule with rolled borders, due to location and appearance concerned for BCC. Will refer to dermatology for consideration of biopsy.           New Medications:   No orders of the defined types were placed in this encounter.      Discontinued Medications:   Medications Discontinued During This Encounter   Medication Reason    hydrOXYzine (ATARAX) 25 MG tablet *Therapy completed    L-Lysine 1000 MG tablet *Therapy completed    ondansetron ODT (ZOFRAN-ODT) 4 MG disintegrating tablet *Therapy completed                 Follow Up:   No follow-ups on file.    Patient was given instructions and counseling regarding her condition or for health maintenance advice. Please see specific information pulled into the AVS if appropriate.       This document has been electronically signed by Arvin Gonzalez DO   April 30, 2025 11:00 EDT    Dictated Utilizing Dragon Dictation: Part of this note may be an electronic transcription/translation of spoken language to printed text using the Dragon Dictation System.

## 2025-05-28 DIAGNOSIS — K57.92 DIVERTICULITIS: Primary | ICD-10-CM

## 2025-05-28 DIAGNOSIS — E03.9 ACQUIRED HYPOTHYROIDISM: ICD-10-CM

## 2025-05-28 RX ORDER — CIPROFLOXACIN 500 MG/1
500 TABLET, FILM COATED ORAL 2 TIMES DAILY
Qty: 20 TABLET | Refills: 0 | Status: SHIPPED | OUTPATIENT
Start: 2025-05-28

## 2025-05-28 RX ORDER — METRONIDAZOLE 500 MG/1
500 TABLET ORAL 2 TIMES DAILY
Qty: 20 TABLET | Refills: 0 | Status: SHIPPED | OUTPATIENT
Start: 2025-05-28

## 2025-08-06 DIAGNOSIS — E28.2 PCOS (POLYCYSTIC OVARIAN SYNDROME): ICD-10-CM

## 2025-08-06 DIAGNOSIS — Z13.6 SCREENING FOR CARDIOVASCULAR CONDITION: ICD-10-CM

## 2025-08-06 DIAGNOSIS — R73.9 HYPERGLYCEMIA: ICD-10-CM

## 2025-08-06 DIAGNOSIS — E55.9 VITAMIN D DEFICIENCY: ICD-10-CM

## 2025-08-06 DIAGNOSIS — R53.83 OTHER FATIGUE: Primary | ICD-10-CM

## 2025-08-06 DIAGNOSIS — R63.5 ABNORMAL WEIGHT GAIN: ICD-10-CM

## 2025-08-06 DIAGNOSIS — R94.6 ABNORMAL THYROID FUNCTION TEST: ICD-10-CM

## 2025-08-07 ENCOUNTER — LAB (OUTPATIENT)
Dept: FAMILY MEDICINE CLINIC | Facility: CLINIC | Age: 40
End: 2025-08-07
Payer: COMMERCIAL

## 2025-08-07 DIAGNOSIS — R73.9 HYPERGLYCEMIA: ICD-10-CM

## 2025-08-07 DIAGNOSIS — R53.83 OTHER FATIGUE: ICD-10-CM

## 2025-08-07 DIAGNOSIS — R63.5 ABNORMAL WEIGHT GAIN: ICD-10-CM

## 2025-08-07 DIAGNOSIS — Z13.6 SCREENING FOR CARDIOVASCULAR CONDITION: ICD-10-CM

## 2025-08-07 DIAGNOSIS — E28.2 PCOS (POLYCYSTIC OVARIAN SYNDROME): ICD-10-CM

## 2025-08-07 DIAGNOSIS — R94.6 ABNORMAL THYROID FUNCTION TEST: ICD-10-CM

## 2025-08-07 DIAGNOSIS — E55.9 VITAMIN D DEFICIENCY: ICD-10-CM

## 2025-08-07 LAB
ALBUMIN SERPL-MCNC: 3.9 G/DL (ref 3.5–5.2)
ALBUMIN/GLOB SERPL: 1.3 G/DL
ALP SERPL-CCNC: 88 U/L (ref 39–117)
ALT SERPL W P-5'-P-CCNC: 15 U/L (ref 1–33)
ANION GAP SERPL CALCULATED.3IONS-SCNC: 12.7 MMOL/L (ref 5–15)
AST SERPL-CCNC: 16 U/L (ref 1–32)
BILIRUB SERPL-MCNC: 0.3 MG/DL (ref 0–1.2)
BUN SERPL-MCNC: 10 MG/DL (ref 6–20)
BUN/CREAT SERPL: 19.2 (ref 7–25)
CALCIUM SPEC-SCNC: 8.9 MG/DL (ref 8.6–10.5)
CHLORIDE SERPL-SCNC: 106 MMOL/L (ref 98–107)
CHOLEST SERPL-MCNC: 187 MG/DL (ref 0–200)
CO2 SERPL-SCNC: 21.3 MMOL/L (ref 22–29)
CORTIS AM PEAK SERPL-MCNC: 6.37 MCG/DL
CREAT SERPL-MCNC: 0.52 MG/DL (ref 0.57–1)
DEPRECATED RDW RBC AUTO: 40.9 FL (ref 37–54)
EGFRCR SERPLBLD CKD-EPI 2021: 122.9 ML/MIN/1.73
ERYTHROCYTE [DISTWIDTH] IN BLOOD BY AUTOMATED COUNT: 13.7 % (ref 12.3–15.4)
FSH SERPL-ACNC: 3.3 MIU/ML
GLOBULIN UR ELPH-MCNC: 2.9 GM/DL
GLUCOSE SERPL-MCNC: 81 MG/DL (ref 65–99)
HBA1C MFR BLD: 5.5 % (ref 4.8–5.6)
HCT VFR BLD AUTO: 42.1 % (ref 34–46.6)
HDLC SERPL-MCNC: 38 MG/DL (ref 40–60)
HGB BLD-MCNC: 13.7 G/DL (ref 12–15.9)
LDLC SERPL CALC-MCNC: 123 MG/DL (ref 0–100)
LDLC/HDLC SERPL: 3.16 {RATIO}
LH SERPL-ACNC: 4.31 MIU/ML
MCH RBC QN AUTO: 27 PG (ref 26.6–33)
MCHC RBC AUTO-ENTMCNC: 32.5 G/DL (ref 31.5–35.7)
MCV RBC AUTO: 82.9 FL (ref 79–97)
PLATELET # BLD AUTO: 258 10*3/MM3 (ref 140–450)
PMV BLD AUTO: 10.7 FL (ref 6–12)
POTASSIUM SERPL-SCNC: 4 MMOL/L (ref 3.5–5.2)
PROT SERPL-MCNC: 6.8 G/DL (ref 6–8.5)
RBC # BLD AUTO: 5.08 10*6/MM3 (ref 3.77–5.28)
SODIUM SERPL-SCNC: 140 MMOL/L (ref 136–145)
T4 FREE SERPL-MCNC: 0.96 NG/DL (ref 0.92–1.68)
TRIGL SERPL-MCNC: 144 MG/DL (ref 0–150)
TSH SERPL DL<=0.05 MIU/L-ACNC: 4.45 UIU/ML (ref 0.27–4.2)
VLDLC SERPL-MCNC: 26 MG/DL (ref 5–40)
WBC NRBC COR # BLD AUTO: 5.83 10*3/MM3 (ref 3.4–10.8)

## 2025-08-07 PROCEDURE — 82746 ASSAY OF FOLIC ACID SERUM: CPT | Performed by: NURSE PRACTITIONER

## 2025-08-07 PROCEDURE — 82306 VITAMIN D 25 HYDROXY: CPT | Performed by: NURSE PRACTITIONER

## 2025-08-07 PROCEDURE — 80050 GENERAL HEALTH PANEL: CPT | Performed by: NURSE PRACTITIONER

## 2025-08-07 PROCEDURE — 84403 ASSAY OF TOTAL TESTOSTERONE: CPT | Performed by: NURSE PRACTITIONER

## 2025-08-07 PROCEDURE — 83001 ASSAY OF GONADOTROPIN (FSH): CPT | Performed by: NURSE PRACTITIONER

## 2025-08-07 PROCEDURE — 82607 VITAMIN B-12: CPT | Performed by: NURSE PRACTITIONER

## 2025-08-07 PROCEDURE — 83002 ASSAY OF GONADOTROPIN (LH): CPT | Performed by: NURSE PRACTITIONER

## 2025-08-07 PROCEDURE — 81001 URINALYSIS AUTO W/SCOPE: CPT | Performed by: NURSE PRACTITIONER

## 2025-08-07 PROCEDURE — 36415 COLL VENOUS BLD VENIPUNCTURE: CPT

## 2025-08-07 PROCEDURE — 83525 ASSAY OF INSULIN: CPT | Performed by: NURSE PRACTITIONER

## 2025-08-07 PROCEDURE — 84402 ASSAY OF FREE TESTOSTERONE: CPT | Performed by: NURSE PRACTITIONER

## 2025-08-07 PROCEDURE — 83036 HEMOGLOBIN GLYCOSYLATED A1C: CPT | Performed by: NURSE PRACTITIONER

## 2025-08-07 PROCEDURE — 82533 TOTAL CORTISOL: CPT | Performed by: NURSE PRACTITIONER

## 2025-08-07 PROCEDURE — 84439 ASSAY OF FREE THYROXINE: CPT | Performed by: NURSE PRACTITIONER

## 2025-08-07 PROCEDURE — 80061 LIPID PANEL: CPT | Performed by: NURSE PRACTITIONER

## 2025-08-08 LAB
25(OH)D3 SERPL-MCNC: 18.4 NG/ML (ref 30–100)
BACTERIA UR QL AUTO: ABNORMAL /HPF
BILIRUB UR QL STRIP: NEGATIVE
CLARITY UR: CLEAR
COD CRY URNS QL: PRESENT /HPF
COLOR UR: YELLOW
FOLATE SERPL-MCNC: 13.7 NG/ML (ref 4.78–24.2)
GLUCOSE UR STRIP-MCNC: NEGATIVE MG/DL
HGB UR QL STRIP.AUTO: NEGATIVE
HOLD SPECIMEN: NORMAL
HYALINE CASTS UR QL AUTO: ABNORMAL /LPF
INSULIN SERPL-ACNC: 58 UIU/ML (ref 2.6–24.9)
KETONES UR QL STRIP: NEGATIVE
LEUKOCYTE ESTERASE UR QL STRIP.AUTO: ABNORMAL
NITRITE UR QL STRIP: NEGATIVE
PH UR STRIP.AUTO: 5.5 [PH] (ref 5–8)
PROT UR QL STRIP: NEGATIVE
RBC # UR STRIP: ABNORMAL /HPF
REF LAB TEST METHOD: ABNORMAL
SP GR UR STRIP: 1.03 (ref 1–1.03)
SQUAMOUS #/AREA URNS HPF: ABNORMAL /HPF
UROBILINOGEN UR QL STRIP: ABNORMAL
VIT B12 BLD-MCNC: 582 PG/ML (ref 211–946)
WBC # UR STRIP: ABNORMAL /HPF
YEAST URNS QL MICRO: ABNORMAL /HPF

## 2025-08-14 LAB
TESTOST FREE SERPL-MCNC: 0.6 PG/ML (ref 0–4.2)
TESTOST SERPL-MCNC: 10 NG/DL (ref 8–60)

## 2025-08-27 ENCOUNTER — OFFICE VISIT (OUTPATIENT)
Dept: CARDIOLOGY | Facility: CLINIC | Age: 40
End: 2025-08-27
Payer: COMMERCIAL

## 2025-08-27 VITALS
DIASTOLIC BLOOD PRESSURE: 90 MMHG | SYSTOLIC BLOOD PRESSURE: 146 MMHG | HEART RATE: 71 BPM | OXYGEN SATURATION: 97 % | WEIGHT: 293 LBS | BODY MASS INDEX: 43.4 KG/M2 | HEIGHT: 69 IN

## 2025-08-27 DIAGNOSIS — I10 PRIMARY HYPERTENSION: ICD-10-CM

## 2025-08-27 DIAGNOSIS — R06.09 DYSPNEA ON EXERTION: Primary | ICD-10-CM

## 2025-08-27 DIAGNOSIS — R53.83 OTHER FATIGUE: ICD-10-CM

## 2025-08-27 PROCEDURE — 99213 OFFICE O/P EST LOW 20 MIN: CPT | Performed by: PHYSICIAN ASSISTANT

## 2025-08-27 PROCEDURE — 93000 ELECTROCARDIOGRAM COMPLETE: CPT | Performed by: PHYSICIAN ASSISTANT
